# Patient Record
Sex: FEMALE | Race: WHITE | NOT HISPANIC OR LATINO | Employment: UNEMPLOYED | URBAN - METROPOLITAN AREA
[De-identification: names, ages, dates, MRNs, and addresses within clinical notes are randomized per-mention and may not be internally consistent; named-entity substitution may affect disease eponyms.]

---

## 2018-02-05 ENCOUNTER — TRANSCRIBE ORDERS (OUTPATIENT)
Dept: ENDOCRINOLOGY | Facility: CLINIC | Age: 23
End: 2018-02-05

## 2018-02-05 DIAGNOSIS — E10.9 DIABETES MELLITUS TYPE 1, UNCOMPLICATED (HCC): Primary | ICD-10-CM

## 2018-02-19 ENCOUNTER — OFFICE VISIT (OUTPATIENT)
Dept: ENDOCRINOLOGY | Facility: CLINIC | Age: 23
End: 2018-02-19

## 2018-02-19 VITALS
SYSTOLIC BLOOD PRESSURE: 118 MMHG | BODY MASS INDEX: 30.4 KG/M2 | OXYGEN SATURATION: 98 % | DIASTOLIC BLOOD PRESSURE: 76 MMHG | HEART RATE: 97 BPM | HEIGHT: 67 IN | WEIGHT: 193.7 LBS

## 2018-02-19 DIAGNOSIS — IMO0002 UNCONTROLLED TYPE 1 DIABETES MELLITUS WITH DIABETIC POLYNEUROPATHY: Primary | ICD-10-CM

## 2018-02-19 LAB
ARTICHOKE IGE QN: 166 MG/DL (ref 0–130)
CHOLEST SERPL-MCNC: 208 MG/DL (ref 0–200)
GLUCOSE BLDC GLUCOMTR-MCNC: 236 MG/DL (ref 70–130)
HBA1C MFR BLD: 7.6 %
HDLC SERPL-MCNC: 41 MG/DL (ref 40–60)
TRIGL SERPL-MCNC: 110 MG/DL (ref 0–150)

## 2018-02-19 PROCEDURE — 80061 LIPID PANEL: CPT | Performed by: PHYSICIAN ASSISTANT

## 2018-02-19 PROCEDURE — 86341 ISLET CELL ANTIBODY: CPT | Performed by: PHYSICIAN ASSISTANT

## 2018-02-19 PROCEDURE — 59 HC ISLET CELL AB SCREEN: Performed by: PHYSICIAN ASSISTANT

## 2018-02-19 PROCEDURE — 84681 ASSAY OF C-PEPTIDE: CPT | Performed by: PHYSICIAN ASSISTANT

## 2018-02-19 PROCEDURE — 59: Performed by: PHYSICIAN ASSISTANT

## 2018-02-19 PROCEDURE — 82947 ASSAY GLUCOSE BLOOD QUANT: CPT | Performed by: PHYSICIAN ASSISTANT

## 2018-02-19 PROCEDURE — 83036 HEMOGLOBIN GLYCOSYLATED A1C: CPT | Performed by: PHYSICIAN ASSISTANT

## 2018-02-19 PROCEDURE — 99204 OFFICE O/P NEW MOD 45 MIN: CPT | Performed by: PHYSICIAN ASSISTANT

## 2018-02-19 RX ORDER — PEN NEEDLE, DIABETIC 31 GX5/16"
NEEDLE, DISPOSABLE MISCELLANEOUS
Refills: 0 | COMMUNITY
Start: 2018-01-23 | End: 2018-03-05 | Stop reason: SDUPTHER

## 2018-02-19 RX ORDER — INSULIN GLARGINE 100 [IU]/ML
INJECTION, SOLUTION SUBCUTANEOUS
Refills: 0 | COMMUNITY
Start: 2018-01-22 | End: 2019-01-09 | Stop reason: SDUPTHER

## 2018-02-19 RX ORDER — LANCETS 28 GAUGE
EACH MISCELLANEOUS
Refills: 0 | COMMUNITY
Start: 2018-02-05 | End: 2020-07-07 | Stop reason: SDUPTHER

## 2018-02-19 RX ORDER — CALCIUM CITRATE/VITAMIN D3 200MG-6.25
TABLET ORAL
Refills: 0 | COMMUNITY
Start: 2018-02-10 | End: 2018-03-14 | Stop reason: SDUPTHER

## 2018-02-19 RX ORDER — INSULIN GLULISINE 100 [IU]/ML
INJECTION, SOLUTION SUBCUTANEOUS
Refills: 0 | COMMUNITY
Start: 2018-02-05 | End: 2019-01-09 | Stop reason: SDUPTHER

## 2018-02-19 RX ORDER — GABAPENTIN 100 MG/1
CAPSULE ORAL
Refills: 0 | COMMUNITY
Start: 2018-01-31 | End: 2020-10-06

## 2018-02-19 RX ORDER — GLUCAGON HYDROCHLORIDE 1 MG
KIT INJECTION
COMMUNITY
Start: 2018-02-15 | End: 2020-07-07 | Stop reason: SDUPTHER

## 2018-02-19 NOTE — PROGRESS NOTES
"Chief Complaint  Establish care for Diabetes Mellitus.    HPI   Suzanne Rowan is a 22 y.o. female who is here today for evaluation of Diabetes Mellitus type 1. Patient was referred by Leilani Davies,*. The initial diagnosis of diabetes was made 1/2018 when she was admitted with DKA. She was hospitalized for 2 weeks, on ventilator for part of that. Prior to that was having a lot of polydipsia. Now improved.  Sister has DM1. Has a 3 yo son.    Today- A1c 7.6,   Labs reviewed from 1/31/18- A1c 9.5, , AST 52, ALLT 67, microalbumin wnl, h/h wnl    Diabetic complications: peripheral neuropathy, numbness top of both feet, R>L, better since starting gabapentin  Eye exam current (within one year): no  Foot care and dental care: discussed    Current diabetic medications include:  Basaglar 10U QHS  Apidra 2U ac (meals), not taking anything before snacks  ACEI/ARB: no  Statin: no, <40 yoa  On Gabapentin 100mg QHS    Past medications: none    Diabetic Monitoring  - checks glucose 5-6x/day  Glucose is averaging- fasting and pre-meal readings 100-200, mostly   Hypoglycemia- couple of lows while out shopping, keeps hard candy with her  Home blood sugar records: glucometer downloaded, data reviewed and scanned to chart    Nutrition:     Current diet: on average, 2 meals per day plus a snack  Current exercise: none  Seen RD in past: no    The following portions of the patient's history were reviewed and updated by me as appropriate: allergies, current medications, past family history, past social history, past surgical history and problem list.    Past Medical History:   Diagnosis Date   • Acid reflux    • Diabetes mellitus type I    • Migraine        Medications    Current Outpatient Prescriptions:   •  APIDRA SOLOSTAR 100 UNIT/ML solution pen-injector, INJECT 2 UNITS SUBCUTANEOUSLY BEFORE MEALS FOR BLOOD SUGAR OVER 150 **NOT COVERED**, Disp: , Rfl: 0  •  B-D INS SYR ULTRAFINE 1CC/31G 31G X 5/16\" 1 ML misc, USE " "AS DIRECTED, Disp: , Rfl: 0  •  B-D ULTRAFINE III SHORT PEN 31G X 8 MM misc, USE ONCE DAILY WITH INSULIN, Disp: , Rfl: 0  •  Blood Glucose Monitoring Suppl (TRUE METRIX METER) w/Device kit, TEST THREE TIMES DAILY, Disp: , Rfl: 0  •  gabapentin (NEURONTIN) 100 MG capsule, TAKE 1 CAPSULE BY MOUTH EVERYDAY AT BEDTIME, Disp: , Rfl: 0  •  GLUCAGEN HYPOKIT 1 MG injection, , Disp: , Rfl:   •  Insulin Glargine (BASAGLAR KWIKPEN) 100 UNIT/ML injection pen, INJECT 10 UNITS UNDER THE SKIN ONCE DAILY, Disp: , Rfl: 0  •  TRUE METRIX BLOOD GLUCOSE TEST test strip, TEST THREE TIMES DAILY, Disp: , Rfl: 0  •  TRUEPLUS SAFETY LANCETS 28G misc, TEST THREE TIMES DAILY, Disp: , Rfl: 0    Review of Systems  Review of Systems   Constitutional: Negative for chills, fatigue, fever and unexpected weight change.   HENT: Positive for rhinorrhea and sore throat. Negative for ear pain, hearing loss and nosebleeds.    Eyes: Negative for pain, discharge, redness, itching and visual disturbance.   Respiratory: Positive for cough. Negative for shortness of breath and wheezing.    Cardiovascular: Negative for chest pain, palpitations and leg swelling.   Gastrointestinal: Positive for constipation. Negative for abdominal pain, blood in stool and diarrhea.   Endocrine: Negative for cold intolerance, heat intolerance and polydipsia.   Genitourinary: Negative for dysuria, frequency, hematuria, pelvic pain, vaginal bleeding and vaginal discharge.   Musculoskeletal: Negative for arthralgias, gait problem, joint swelling and myalgias.   Skin: Negative for rash.   Allergic/Immunologic: Negative.    Neurological: Negative for dizziness, syncope, weakness, numbness and headaches.   Hematological: Negative for adenopathy. Does not bruise/bleed easily.   Psychiatric/Behavioral: Negative for sleep disturbance and suicidal ideas. The patient is nervous/anxious.         Physical Exam    /76  Pulse 97  Ht 170 cm (66.93\")  Wt 87.9 kg (193 lb 11.2 oz)  SpO2 " 98%  BMI 30.4 kg/m2Body mass index is 30.4 kg/(m^2).  Physical Exam    Suzanne had a diabetic foot exam performed (no calluses or ulcers) today.   During the foot exam she had a monofilament test performed (intact sensation to monofilament bilaterally).    Vascular Status -  Her exam exhibits right foot vasculature normal. Her exam exhibits no right foot edema. Her exam exhibits left foot vasculature normal. Her exam exhibits no left foot edema.   Skin Integrity  -  Her right foot skin is intact.     Suzanne 's left foot skin is intact. .      Labs and Imaging   No results found for: HGBA1C  No visits with results within 1 Month(s) from this visit.  Latest known visit with results is:    Admission on 08/20/2014, Discharged on 08/24/2014   Component Date Value Ref Range Status   • WBC 08/21/2014 13.87* 4.50 - 13.50 K/mcL Final   • RBC 08/21/2014 4.64  3.89 - 5.14 M/mcL Final   • Hemoglobin 08/21/2014 12.8  11.5 - 15.5 g/dL Final   • Hematocrit 08/21/2014 38.1  34.5 - 44.0 % Final   • MCV 08/21/2014 82.1  80.0 - 99.0 fL Final   • MCH 08/21/2014 27.6  27.0 - 31.0 pg Final   • MCHC 08/21/2014 33.6  32.0 - 36.0 g/dL Final   • RDW-CV 08/21/2014 13.3  11.3 - 14.5 % Final   • Platelets 08/21/2014 268  150 - 450 K/mcL Final   • ABORh 08/21/2014 A Rh Positive   Final   • Antibody Screen 08/21/2014 Negative   Final   • Creatinine 08/21/2014 0.6  0.6 - 1.3 mg/dL Final   • Uric Acid 08/21/2014 5.2  3.1 - 7.8 mg/dL Final   • Alkaline Phosphatase 08/21/2014 280* 35 - 109 Units/L Final   • AST (SGOT) 08/21/2014 16  0 - 33 Units/L Final   • LDH 08/21/2014 184  120 - 246 Units/L Final   • ALT (SGPT) 08/21/2014 16  7 - 40 Units/L Final   • Total Bilirubin 08/21/2014 0.2* 0.3 - 1.2 mg/dL Final   • WBC 08/22/2014 11.94  4.50 - 13.50 K/mcL Final   • RBC 08/22/2014 4.12  3.89 - 5.14 M/mcL Final   • Hemoglobin 08/22/2014 11.2* 11.5 - 15.5 g/dL Final   • Hematocrit 08/22/2014 34.6  34.5 - 44.0 % Final   • MCV 08/22/2014 84.0  80.0 -  99.0 fL Final   • MCH 08/22/2014 27.2  27.0 - 31.0 pg Final   • MCHC 08/22/2014 32.4  32.0 - 36.0 g/dL Final   • RDW-CV 08/22/2014 13.7  11.3 - 14.5 % Final   • Platelets 08/22/2014 197  150 - 450 K/mcL Final     No images are attached to the encounter or orders placed in the encounter.  No Images in the past 120 days found..    Assessment / Plan   Andtracy was seen today for diabetes.    Diagnoses and all orders for this visit:    Uncontrolled type 1 diabetes mellitus with diabetic polyneuropathy  -     C-Peptide  -     Glutamic Acid Decarboxylase  -     Anti-islet Cell Antibody  -     IA-2 Autoantibodies  -     Lipid Panel  -     Ambulatory Referral to Diabetic Education        Diabetes Mellitus 1  -s/p recent dx with DKA, was hospitalized x 2 weeks  -A1c 7.6, down from 9.5 1/31/18  -she is checking sugar multiple times per day and is compliant with insulin  -increase basaglar to 11U  -continue taking 2U ac, can try taking 1U for snack  -discussed foot care, hypoglycemia tx  -discussed pump option with her. She is not interested at this time. Sister has DM1 and used a pump before but went back to injections.  -will refer to dexcom for CGM  -referral for diabetes education  -discussed nature of DM, Risks of uncontrolled DM.  -provided email for questions, BG issues    1.  Diet: 3-4 carb servings per meal for females, 4-5 carb servings per meal for males  Spread carb intake throughout the day  Increase lean protein and vegetable intake  Avoid sugary drinks and processed carbs including crackers, cookies, cakes  2.  Exercise: Recommend at least 30 minutes of exercise daily, at least 5 days per week. Increase exercise gradually.   3.  Blood Glucose Goal: Blood glucose goal <150 fasting, <180 2 hr postprandial  4.  Microalbumin due 1/2019  5.  Education performed during this visit: long term diabetic complications discussed. , annual eye examinations at Ophthalmology discussed, dental hygiene discussed  and foot care  reviewed., home glucose monitoring emphasized, all medications, side effects and compliance discussed carefully and Hypoglycemia management and prevention reviewed. Reviewed ‘ABCs’ of diabetes management (respective goals in parentheses):  A1C (<7), blood pressure (<130/80), and cholesterol (LDL <100, if CVD <70).    There are no Patient Instructions on file for this visit.    Follow up: Return in about 3 months (around 5/19/2018).    Discussed the nature of the disease including, risks, complications, implications, management, safe and proper use of medications. Encouraged therapeutic lifestyle changes including low calorie diet with plenty of fruits and vegetables, daily exercise, medication compliance, and keeping scheduled follow up appointments with me and any other providers. Encouraged patient to have appointment for complete physical, fasting labs, appropriate screenings, and immunizations on an annual basis.    30 min  of 45 min face-to-face visit time spent for coordination of care and counselling regarding identified problems as outlined in the objective, assessment and discussion portions of the documentation.    Anais Cee PA-C        .

## 2018-02-20 LAB — C PEPTIDE SERPL-MCNC: 11.8 NG/ML (ref 1.1–4.4)

## 2018-02-21 LAB
GAD65 AB SER-ACNC: <5 U/ML (ref 0–5)
PANC ISLET CELL AB TITR SER: NEGATIVE {TITER}

## 2018-02-23 LAB — ISLET CELL512 AB SER-ACNC: <1 U/ML

## 2018-03-05 DIAGNOSIS — E10.9 TYPE 1 DIABETES MELLITUS WITHOUT COMPLICATION (HCC): Primary | ICD-10-CM

## 2018-03-05 RX ORDER — PEN NEEDLE, DIABETIC 31 GX5/16"
NEEDLE, DISPOSABLE MISCELLANEOUS
Qty: 120 EACH | Refills: 12 | Status: SHIPPED | OUTPATIENT
Start: 2018-03-05 | End: 2019-08-07 | Stop reason: SDUPTHER

## 2018-03-12 ENCOUNTER — HOSPITAL ENCOUNTER (OUTPATIENT)
Dept: DIABETES SERVICES | Facility: HOSPITAL | Age: 23
Setting detail: RECURRING SERIES
Discharge: HOME | End: 2018-03-12

## 2018-03-12 PROCEDURE — G0108 DIAB MANAGE TRN  PER INDIV: HCPCS | Performed by: DIETITIAN, REGISTERED

## 2018-03-14 DIAGNOSIS — E10.9 TYPE 1 DIABETES MELLITUS WITHOUT COMPLICATION (HCC): Primary | ICD-10-CM

## 2018-03-14 RX ORDER — CALCIUM CITRATE/VITAMIN D3 200MG-6.25
TABLET ORAL
Qty: 150 EACH | Refills: 12 | Status: SHIPPED | OUTPATIENT
Start: 2018-03-14 | End: 2018-05-07 | Stop reason: SDUPTHER

## 2018-04-09 ENCOUNTER — TELEPHONE (OUTPATIENT)
Dept: INTERNAL MEDICINE | Facility: CLINIC | Age: 23
End: 2018-04-09

## 2018-04-09 NOTE — TELEPHONE ENCOUNTER
Returned Ben's call and informed him that we did receive the fax but have just been waiting on Dr. Beckford to return to the office so that she can sign the documents in order for us to fax it back.

## 2018-04-09 NOTE — TELEPHONE ENCOUNTER
CALL BACK [phone] A REP WITH [employer] CALLED REGARDING NEEDING CHART NOTES, CERT OF MEDICAL NECESSITY. THEY STATE THIS HAS BEEN SENT SEVERAL TIME WITH NO RESPONSE. VERIFIED FAX # FOR THIS OFFICE WITH REP AND THEY STATE THEY WILL RESEND.

## 2018-05-07 DIAGNOSIS — E10.9 TYPE 1 DIABETES MELLITUS WITHOUT COMPLICATION (HCC): ICD-10-CM

## 2018-05-07 RX ORDER — CALCIUM CITRATE/VITAMIN D3 200MG-6.25
TABLET ORAL
Qty: 200 EACH | Refills: 12 | Status: SHIPPED | OUTPATIENT
Start: 2018-05-07 | End: 2020-07-07 | Stop reason: SDUPTHER

## 2018-05-24 ENCOUNTER — OFFICE VISIT (OUTPATIENT)
Dept: ENDOCRINOLOGY | Facility: CLINIC | Age: 23
End: 2018-05-24

## 2018-05-24 VITALS
WEIGHT: 202 LBS | HEART RATE: 115 BPM | SYSTOLIC BLOOD PRESSURE: 116 MMHG | BODY MASS INDEX: 31.71 KG/M2 | OXYGEN SATURATION: 92 % | HEIGHT: 67 IN | DIASTOLIC BLOOD PRESSURE: 74 MMHG

## 2018-05-24 DIAGNOSIS — E10.42 TYPE 1 DIABETES MELLITUS WITH DIABETIC POLYNEUROPATHY (HCC): Primary | ICD-10-CM

## 2018-05-24 LAB
GLUCOSE BLDC GLUCOMTR-MCNC: 199 MG/DL (ref 70–130)
HBA1C MFR BLD: 6.8 %

## 2018-05-24 PROCEDURE — 82947 ASSAY GLUCOSE BLOOD QUANT: CPT | Performed by: PHYSICIAN ASSISTANT

## 2018-05-24 PROCEDURE — 99214 OFFICE O/P EST MOD 30 MIN: CPT | Performed by: PHYSICIAN ASSISTANT

## 2018-05-24 PROCEDURE — 83036 HEMOGLOBIN GLYCOSYLATED A1C: CPT | Performed by: PHYSICIAN ASSISTANT

## 2018-05-24 NOTE — PROGRESS NOTES
Chief Complaint  F/u for Diabetes Mellitus.    HPI   Suzanne Rowan is a 23 y.o. female who is here today for F/u of Diabetes Mellitus type 1. The initial diagnosis of diabetes was made 1/2018 when she was admitted with DKA. She was hospitalized for 2 weeks, on ventilator for part of that. Prior to that was having a lot of polydipsia.   Sister has DM1. Has a 3 yo son.    Hx of enlarged spleen, sees GI 6/2018.  Received her Dexcom, but hasn't started using it yet. She's hesitant to have something attached to her all the time  A1c- 6.8 (5/24/18), 7.6 (2/19/18), 9.5 (1/31/18)  Labs reviewed from 1/31/18- , AST 52, ALT 67, microalbumin wnl, h/h wnl    Diabetic complications: peripheral neuropathy, numbness top of both feet, R>L, better since starting gabapentin  Eye exam current (within one year): no  Foot care and dental care: discussed    Current diabetic medications include:  Basaglar 11U QHS  Apidra 2U ac (meals), 1U before snacks  ACEI/ARB: no  Statin: no, <40 yoa  On Gabapentin 100mg QHS- ran out a month ago. No difference in symptoms. Has some tingling at night but not bad, doesn't think she needs to take gabapentin.     Past medications: none    Diabetic Monitoring  - checks glucose 5-6x/day  Glucose is averaging- fasting 120-150, pre-meal sugar ~125   Hypoglycemia- no  Home blood sugar records: glucometer downloaded, data reviewed and scanned to chart    Nutrition:     Current diet: on average, 2 meals per day plus a snack  Current exercise: none  Seen RD in past: no    The following portions of the patient's history were reviewed and updated by me as appropriate: allergies, current medications, past family history, past social history, past surgical history and problem list.    Past Medical History:   Diagnosis Date   • Acid reflux    • Diabetes mellitus type I    • Migraine        Medications    Current Outpatient Prescriptions:   •  APIDRA SOLOSTAR 100 UNIT/ML solution pen-injector, INJECT 2 UNITS  "SUBCUTANEOUSLY BEFORE MEALS FOR BLOOD SUGAR OVER 150 **NOT COVERED**, Disp: , Rfl: 0  •  B-D INS SYR ULTRAFINE 1CC/31G 31G X 5/16\" 1 ML misc, USE AS DIRECTED, Disp: , Rfl: 0  •  B-D ULTRAFINE III SHORT PEN 31G X 8 MM misc, Use QID, Disp: 120 each, Rfl: 12  •  Blood Glucose Monitoring Suppl (TRUE METRIX METER) w/Device kit, TEST THREE TIMES DAILY, Disp: , Rfl: 0  •  GLUCAGEN HYPOKIT 1 MG injection, , Disp: , Rfl:   •  Insulin Glargine (BASAGLAR KWIKPEN) 100 UNIT/ML injection pen, INJECT 10 UNITS UNDER THE SKIN ONCE DAILY, Disp: , Rfl: 0  •  TRUE METRIX BLOOD GLUCOSE TEST test strip, Test 5 times daily, Disp: 200 each, Rfl: 12  •  TRUEPLUS SAFETY LANCETS 28G misc, TEST THREE TIMES DAILY, Disp: , Rfl: 0  •  gabapentin (NEURONTIN) 100 MG capsule, TAKE 1 CAPSULE BY MOUTH EVERYDAY AT BEDTIME, Disp: , Rfl: 0    Review of Systems  Review of Systems   Constitutional: Negative for chills, fatigue, fever and unexpected weight change.   HENT: Negative for ear pain, hearing loss and nosebleeds.    Eyes: Negative for pain, discharge, redness, itching and visual disturbance.   Respiratory: Negative for shortness of breath and wheezing.    Cardiovascular: Negative for chest pain, palpitations and leg swelling.   Gastrointestinal: Negative for abdominal pain, blood in stool and diarrhea.   Endocrine: Negative for cold intolerance, heat intolerance and polydipsia.   Genitourinary: Negative for dysuria, frequency, hematuria, pelvic pain, vaginal bleeding and vaginal discharge.   Musculoskeletal: Negative for arthralgias, gait problem, joint swelling and myalgias.   Skin: Negative for rash.   Allergic/Immunologic: Negative.    Neurological: Negative for dizziness, syncope, weakness, numbness and headaches.        Mild tingling both feet- at night   Hematological: Negative for adenopathy. Does not bruise/bleed easily.   Psychiatric/Behavioral: Negative for sleep disturbance and suicidal ideas. The patient is nervous/anxious.       " "  Physical Exam    /74   Pulse 115   Ht 170 cm (66.93\")   Wt 91.6 kg (202 lb)   SpO2 92%   BMI 31.70 kg/m² Body mass index is 31.7 kg/m².  Physical Exam   Constitutional: She is oriented to person, place, and time. She appears well-developed. No distress.   HENT:   Head: Normocephalic.   Right Ear: External ear normal.   Left Ear: External ear normal.   Mouth/Throat: No oropharyngeal exudate.   Eyes: Conjunctivae and lids are normal. Right eye exhibits no discharge. Left eye exhibits no discharge. Right pupil is reactive. Left pupil is reactive.   Neck: No JVD present. No tracheal deviation present. No thyroid mass and no thyromegaly present.   Cardiovascular: Normal rate, regular rhythm, normal heart sounds and intact distal pulses.    No murmur heard.  Pulmonary/Chest: Effort normal and breath sounds normal. No respiratory distress. She has no wheezes.   Abdominal: Soft. Bowel sounds are normal. There is no tenderness.   Musculoskeletal: She exhibits no edema or tenderness.   Lymphadenopathy:     She has no cervical adenopathy.   Neurological: She is alert and oriented to person, place, and time.   Skin: Skin is warm, dry and intact. No rash noted. She is not diaphoretic. No erythema.   Psychiatric: She has a normal mood and affect. Her speech is normal and behavior is normal. Thought content normal.       Labs and Imaging   Lab Results   Component Value Date    HGBA1C 6.8 05/24/2018    HGBA1C 7.6 02/19/2018     Office Visit on 05/24/2018   Component Date Value Ref Range Status   • Hemoglobin A1C 05/24/2018 6.8  % Final   • Glucose 05/24/2018 199* 70 - 130 mg/dL Final     No images are attached to the encounter or orders placed in the encounter.  No Images in the past 120 days found..    Assessment / Plan   Andwjakob was seen today for diabetes.    Diagnoses and all orders for this visit:    Uncontrolled type 1 diabetes mellitus with diabetic polyneuropathy  -     POC Glycosylated Hemoglobin (Hb A1C)  - "     POCT Glucose        Diabetes Mellitus 1  -s/p DKA 1/2018, this was the initial dx DM1, was hospitalized x 2 weeks  -A1c 6.8, down from 7.6 2/2018. No hypoglycemia.   -she is checking sugar multiple times per day and is compliant with insulin  -continue basaglar to 11U  -continue taking 2U ac, 1U for snack  -encouraged she start using the dexcom  -she is not interested in a pump. Sister used to have insulin pump and didn't like it  -went to diabetes education. Doesn't want to count carbs.  -discussed nature of DM, Risks of uncontrolled DM.  -she has my email for questions  -encouraged she have an eye exam  -she is not using any form of birth control. Discussed risk of congenital malformation with a1c >6.5    1.  Diet: 3-4 carb servings per meal for females, 4-5 carb servings per meal for males  Spread carb intake throughout the day  Increase lean protein and vegetable intake  Avoid sugary drinks and processed carbs including crackers, cookies, cakes  2.  Exercise: Recommend at least 30 minutes of exercise daily, at least 5 days per week. Increase exercise gradually.   3.  Blood Glucose Goal: Blood glucose goal <150 fasting, <180 2 hr postprandial  4.  Microalbumin due 1/2019  5.  Education performed during this visit: long term diabetic complications discussed. , annual eye examinations at Ophthalmology discussed, dental hygiene discussed  and foot care reviewed., home glucose monitoring emphasized, all medications, side effects and compliance discussed carefully and Hypoglycemia management and prevention reviewed. Reviewed ‘ABCs’ of diabetes management (respective goals in parentheses):  A1C (<7), blood pressure (<130/80), and cholesterol (LDL <100, if CVD <70).    There are no Patient Instructions on file for this visit.    Follow up: Return in about 3 months (around 8/24/2018).    Discussed the nature of the disease including, risks, complications, implications, management, safe and proper use of medications.  Encouraged therapeutic lifestyle changes including low calorie diet with plenty of fruits and vegetables, daily exercise, medication compliance, and keeping scheduled follow up appointments with me and any other providers. Encouraged patient to have appointment for complete physical, fasting labs, appropriate screenings, and immunizations on an annual basis.    20 min  of 30 min face-to-face visit time spent for coordination of care and counselling regarding identified problems as outlined in the objective, assessment and discussion portions of the documentation.    Anais Cee PA-C        .

## 2018-08-29 ENCOUNTER — OFFICE VISIT (OUTPATIENT)
Dept: ENDOCRINOLOGY | Facility: CLINIC | Age: 23
End: 2018-08-29

## 2018-08-29 VITALS
HEART RATE: 99 BPM | SYSTOLIC BLOOD PRESSURE: 118 MMHG | BODY MASS INDEX: 32.1 KG/M2 | WEIGHT: 204.5 LBS | OXYGEN SATURATION: 98 % | DIASTOLIC BLOOD PRESSURE: 84 MMHG

## 2018-08-29 DIAGNOSIS — E10.9 TYPE 1 DIABETES MELLITUS WITHOUT COMPLICATION (HCC): Primary | ICD-10-CM

## 2018-08-29 LAB
GLUCOSE BLDC GLUCOMTR-MCNC: 143 MG/DL (ref 70–130)
HBA1C MFR BLD: 6.3 %

## 2018-08-29 PROCEDURE — 82962 GLUCOSE BLOOD TEST: CPT | Performed by: PHYSICIAN ASSISTANT

## 2018-08-29 PROCEDURE — 83036 HEMOGLOBIN GLYCOSYLATED A1C: CPT | Performed by: PHYSICIAN ASSISTANT

## 2018-08-29 PROCEDURE — 99214 OFFICE O/P EST MOD 30 MIN: CPT | Performed by: PHYSICIAN ASSISTANT

## 2018-08-29 NOTE — PROGRESS NOTES
Chief Complaint  F/u for Diabetes Mellitus.    ABILIO Rowan is a 23 y.o. female who is here today for F/u of Diabetes Mellitus type 1. The initial diagnosis of diabetes was made 1/2018 when she was admitted with DKA. She was hospitalized for 2 weeks, on ventilator for part of that. Prior to that was having a lot of polydipsia.   Sister has DM1. Has a 3 yo son. Had GDM.       Still hesitant to use her Dexcom  A1c- 6.3 (8/29/18), 6.8 (5/24/18), 7.6 (2/19/18), 9.5 (1/31/18)  Labs reviewed from 1/31/18- , AST 52, ALT 67, microalbumin wnl, h/h wnl    Diabetic complications: peripheral neuropathy, numbness top of both feet, R>L, better since starting gabapentin  Eye exam current (within one year): no  Foot care and dental care: discussed    Current diabetic medications include:  Basaglar 11U QHS  Apidra 2U ac (meals), 1U before snacks  ACEI/ARB: no  Statin: no, <40 yoa  On Gabapentin 100mg QHS- ran out a month ago. No difference in symptoms. Has some tingling at night but not bad, doesn't think she needs to take gabapentin.     Past medications: none    Diabetic Monitoring  - checks glucose 2-3x/day  Glucose is averaging- fasting 100-150, pre-meal sugar ~120-160   Hypoglycemia- single episode hypoglycemia of 47 on a day she was busy and didn't eat all day.  Home blood sugar records: glucometer downloaded, data reviewed and scanned to chart    Nutrition:     Current diet: on average, 2 meals per day plus a snack  Current exercise: none  Seen RD in past: no    The following portions of the patient's history were reviewed and updated by me as appropriate: allergies, current medications, past family history, past social history, past surgical history and problem list.    Past Medical History:   Diagnosis Date   • Acid reflux    • Diabetes mellitus type I (CMS/[facility])    • Migraine        Medications    Current Outpatient Prescriptions:   •  APIDRA SOLOSTAR 100 UNIT/ML solution pen-injector, INJECT 2 UNITS  "SUBCUTANEOUSLY BEFORE MEALS FOR BLOOD SUGAR OVER 150 **NOT COVERED**, Disp: , Rfl: 0  •  B-D INS SYR ULTRAFINE 1CC/31G 31G X 5/16\" 1 ML misc, USE AS DIRECTED, Disp: , Rfl: 0  •  B-D ULTRAFINE III SHORT PEN 31G X 8 MM misc, Use QID, Disp: 120 each, Rfl: 12  •  Blood Glucose Monitoring Suppl (TRUE METRIX METER) w/Device kit, TEST THREE TIMES DAILY, Disp: , Rfl: 0  •  GLUCAGEN HYPOKIT 1 MG injection, , Disp: , Rfl:   •  Insulin Glargine (BASAGLAR KWIKPEN) 100 UNIT/ML injection pen, INJECT 10 UNITS UNDER THE SKIN ONCE DAILY, Disp: , Rfl: 0  •  TRUE METRIX BLOOD GLUCOSE TEST test strip, Test 5 times daily, Disp: 200 each, Rfl: 12  •  TRUEPLUS SAFETY LANCETS 28G misc, TEST THREE TIMES DAILY, Disp: , Rfl: 0  •  gabapentin (NEURONTIN) 100 MG capsule, TAKE 1 CAPSULE BY MOUTH EVERYDAY AT BEDTIME, Disp: , Rfl: 0    Review of Systems  Review of Systems   Constitutional: Negative for chills, fatigue, fever and unexpected weight change.   HENT: Negative for ear pain, hearing loss and nosebleeds.    Eyes: Negative for pain, discharge, redness, itching and visual disturbance.   Respiratory: Negative for shortness of breath and wheezing.    Cardiovascular: Negative for chest pain, palpitations and leg swelling.   Gastrointestinal: Negative for abdominal pain, blood in stool and diarrhea.   Endocrine: Negative for cold intolerance, heat intolerance and polydipsia.   Genitourinary: Negative for dysuria, frequency, hematuria, pelvic pain, vaginal bleeding and vaginal discharge.   Musculoskeletal: Negative for arthralgias, gait problem, joint swelling and myalgias.   Skin: Negative for rash.   Allergic/Immunologic: Negative.    Neurological: Negative for dizziness, syncope, weakness, numbness and headaches.        Mild tingling both feet- at night   Hematological: Negative for adenopathy. Does not bruise/bleed easily.   Psychiatric/Behavioral: Negative for sleep disturbance and suicidal ideas. The patient is nervous/anxious.       "   Physical Exam    /84   Pulse 99   Wt 92.8 kg (204 lb 8 oz)   SpO2 98%   BMI 32.10 kg/m² Body mass index is 32.1 kg/m².  Physical Exam   Constitutional: She is oriented to person, place, and time. She appears well-developed. No distress.   HENT:   Head: Normocephalic.   Right Ear: External ear normal.   Left Ear: External ear normal.   Mouth/Throat: No oropharyngeal exudate.   Eyes: Conjunctivae and lids are normal. Right eye exhibits no discharge. Left eye exhibits no discharge. Right pupil is reactive. Left pupil is reactive.   Neck: No JVD present. No tracheal deviation present. No thyroid mass and no thyromegaly present.   Cardiovascular: Normal rate, regular rhythm, normal heart sounds and intact distal pulses.    No murmur heard.  Pulmonary/Chest: Effort normal and breath sounds normal. No respiratory distress. She has no wheezes.   Abdominal: Soft. Bowel sounds are normal. There is no tenderness.   Musculoskeletal: She exhibits no edema or tenderness.   Lymphadenopathy:     She has no cervical adenopathy.   Neurological: She is alert and oriented to person, place, and time.   Skin: Skin is warm, dry and intact. No rash noted. She is not diaphoretic. No erythema.   Psychiatric: She has a normal mood and affect. Her speech is normal and behavior is normal. Thought content normal.       Labs and Imaging   Lab Results   Component Value Date    HGBA1C 6.3 08/29/2018    HGBA1C 6.8 05/24/2018    HGBA1C 7.6 02/19/2018     Office Visit on 08/29/2018   Component Date Value Ref Range Status   • Glucose 08/29/2018 143* 70 - 130 mg/dL Final   • Hemoglobin A1C 08/29/2018 6.3  % Final     No images are attached to the encounter or orders placed in the encounter.  No Images in the past 120 days found..    Assessment / Plan   Andwjakob was seen today for follow-up.    Diagnoses and all orders for this visit:    Type 1 diabetes mellitus without complication (CMS/[facility])  -     POC Glucose Fingerstick  -     POC  Glycosylated Hemoglobin (Hb A1C)        Diabetes Mellitus 1  -s/p DKA 1/2018, this was the initial dx DM1, was hospitalized x 2 weeks  -A1c 6.3, down from 6.8 last visit   -she is checking sugar multiple times per day and is compliant with insulin  -continue basaglar to 11U  -continue taking 2-3U ac  -encouraged not skipping meals  -encouraged she start using the dexcom again  -reviewed insulin pump options. She will think about tandem and let me know  -went to diabetes education. Doesn't want to count carbs.  -she has my email for questions  -encouraged she have an eye exam again    1.  Diet: 3-4 carb servings per meal for females, 4-5 carb servings per meal for males  Spread carb intake throughout the day  Increase lean protein and vegetable intake  Avoid sugary drinks and processed carbs including crackers, cookies, cakes  2.  Exercise: Recommend at least 30 minutes of exercise daily, at least 5 days per week. Increase exercise gradually.   3.  Blood Glucose Goal: Blood glucose goal <150 fasting, <180 2 hr postprandial  4.  Microalbumin due 1/2019  5.  Education performed during this visit: long term diabetic complications discussed. , annual eye examinations at Ophthalmology discussed, dental hygiene discussed  and foot care reviewed., home glucose monitoring emphasized, all medications, side effects and compliance discussed carefully and Hypoglycemia management and prevention reviewed. Reviewed ‘ABCs’ of diabetes management (respective goals in parentheses):  A1C (<7), blood pressure (<130/80), and cholesterol (LDL <100, if CVD <70).    There are no Patient Instructions on file for this visit.    Follow up: Return in about 4 months (around 12/29/2018).    Discussed the nature of the disease including, risks, complications, implications, management, safe and proper use of medications. Encouraged therapeutic lifestyle changes including low calorie diet with plenty of fruits and vegetables, daily exercise,  medication compliance, and keeping scheduled follow up appointments with me and any other providers. Encouraged patient to have appointment for complete physical, fasting labs, appropriate screenings, and immunizations on an annual basis.    20 min  of 30 min face-to-face visit time spent for coordination of care and counselling regarding identified problems as outlined in the objective, assessment and discussion portions of the documentation.    Anais Cee PA-C        .

## 2019-01-09 ENCOUNTER — OFFICE VISIT (OUTPATIENT)
Dept: ENDOCRINOLOGY | Facility: CLINIC | Age: 24
End: 2019-01-09

## 2019-01-09 VITALS
SYSTOLIC BLOOD PRESSURE: 130 MMHG | DIASTOLIC BLOOD PRESSURE: 82 MMHG | OXYGEN SATURATION: 98 % | BODY MASS INDEX: 32.96 KG/M2 | HEART RATE: 89 BPM | WEIGHT: 210 LBS

## 2019-01-09 DIAGNOSIS — E16.2 HYPOGLYCEMIA: ICD-10-CM

## 2019-01-09 DIAGNOSIS — E10.9 TYPE 1 DIABETES MELLITUS WITHOUT COMPLICATION (HCC): Primary | ICD-10-CM

## 2019-01-09 LAB
ALBUMIN SERPL-MCNC: 4.33 G/DL (ref 3.2–4.8)
ALBUMIN/GLOB SERPL: 1.9 G/DL (ref 1.5–2.5)
ALP SERPL-CCNC: 68 U/L (ref 25–100)
ALT SERPL W P-5'-P-CCNC: 23 U/L (ref 7–40)
ANION GAP SERPL CALCULATED.3IONS-SCNC: 5 MMOL/L (ref 3–11)
ARTICHOKE IGE QN: 111 MG/DL (ref 0–130)
AST SERPL-CCNC: 11 U/L (ref 0–33)
BILIRUB SERPL-MCNC: 0.2 MG/DL (ref 0.3–1.2)
BUN BLD-MCNC: 13 MG/DL (ref 9–23)
BUN/CREAT SERPL: 19.7 (ref 7–25)
CALCIUM SPEC-SCNC: 9.1 MG/DL (ref 8.7–10.4)
CHLORIDE SERPL-SCNC: 105 MMOL/L (ref 99–109)
CHOLEST SERPL-MCNC: 161 MG/DL (ref 0–200)
CO2 SERPL-SCNC: 29 MMOL/L (ref 20–31)
CREAT BLD-MCNC: 0.66 MG/DL (ref 0.6–1.3)
GFR SERPL CREATININE-BSD FRML MDRD: 112 ML/MIN/1.73
GLOBULIN UR ELPH-MCNC: 2.3 GM/DL
GLUCOSE BLD-MCNC: 155 MG/DL (ref 70–100)
GLUCOSE BLDC GLUCOMTR-MCNC: 201 MG/DL (ref 70–130)
HBA1C MFR BLD: 6.3 %
HDLC SERPL-MCNC: 55 MG/DL (ref 40–60)
POTASSIUM BLD-SCNC: 4.5 MMOL/L (ref 3.5–5.5)
PROT SERPL-MCNC: 6.6 G/DL (ref 5.7–8.2)
SODIUM BLD-SCNC: 139 MMOL/L (ref 132–146)
TRIGL SERPL-MCNC: 87 MG/DL (ref 0–150)
TSH SERPL DL<=0.05 MIU/L-ACNC: 2.03 MIU/ML (ref 0.35–5.35)

## 2019-01-09 PROCEDURE — 82043 UR ALBUMIN QUANTITATIVE: CPT | Performed by: PHYSICIAN ASSISTANT

## 2019-01-09 PROCEDURE — 84443 ASSAY THYROID STIM HORMONE: CPT | Performed by: PHYSICIAN ASSISTANT

## 2019-01-09 PROCEDURE — 82570 ASSAY OF URINE CREATININE: CPT | Performed by: PHYSICIAN ASSISTANT

## 2019-01-09 PROCEDURE — 80053 COMPREHEN METABOLIC PANEL: CPT | Performed by: PHYSICIAN ASSISTANT

## 2019-01-09 PROCEDURE — 99214 OFFICE O/P EST MOD 30 MIN: CPT | Performed by: PHYSICIAN ASSISTANT

## 2019-01-09 PROCEDURE — 80061 LIPID PANEL: CPT | Performed by: PHYSICIAN ASSISTANT

## 2019-01-09 PROCEDURE — 83036 HEMOGLOBIN GLYCOSYLATED A1C: CPT | Performed by: PHYSICIAN ASSISTANT

## 2019-01-09 PROCEDURE — 82947 ASSAY GLUCOSE BLOOD QUANT: CPT | Performed by: PHYSICIAN ASSISTANT

## 2019-01-09 RX ORDER — INSULIN GLARGINE 100 [IU]/ML
10 INJECTION, SOLUTION SUBCUTANEOUS NIGHTLY
Qty: 2 PEN | Refills: 6 | Status: SHIPPED | OUTPATIENT
Start: 2019-01-09 | End: 2020-07-07 | Stop reason: SDUPTHER

## 2019-01-09 RX ORDER — INSULIN GLULISINE 100 [IU]/ML
INJECTION, SOLUTION SUBCUTANEOUS
Qty: 2 PEN | Refills: 6 | Status: SHIPPED | OUTPATIENT
Start: 2019-01-09 | End: 2019-01-14 | Stop reason: SDUPTHER

## 2019-01-09 NOTE — PROGRESS NOTES
"Chief Complaint  F/u for Diabetes Mellitus.    HPI   Suzanne Rowan is a 23 y.o. female who is here today for F/u of Diabetes Mellitus type 1. The initial diagnosis of diabetes was made 1/2018 when she was admitted with DKA. She was hospitalized for 2 weeks, on ventilator for part of that. Prior to that was having a lot of polydipsia.   Sister has DM1. Had GDM.     A1c- 6.3 (1/9/19), 6.3 (8/29/18), 6.8 (5/24/18), 7.6 (2/19/18), 9.5 (1/31/18)  Labs reviewed from 1/31/18- , AST 52, ALT 67, microalbumin wnl, h/h wnl    Diabetic complications: peripheral neuropathy, numbness top of both feet, R>L, better since starting gabapentin  Eye exam current (within one year): no  Foot care and dental care: discussed    Current diabetic medications include:  Basaglar 11U QHS  Apidra 2U ac (meals), 1U before snacks  ACEI/ARB: no  Statin: no, <40 yoa    Past medications: gabapentin 100mg qd (didn't notice a difference off the medication)    Diabetic Monitoring  -   Reviewed limited bs readings from meter download- bs is <150, single episode hypoglycemia of 50 from meter download, however she reports hypoglycemia qod, as low as 39 one time    Nutrition:     Current diet: on average, 2 meals per day plus a snack  Current exercise: none  Seen RD in past: no    The following portions of the patient's history were reviewed and updated by me as appropriate: allergies, current medications, past family history, past social history, past surgical history and problem list.    Past Medical History:   Diagnosis Date   • Acid reflux    • Diabetes mellitus type I (CMS/[facility])    • Migraine        Medications    Current Outpatient Medications:   •  APIDRA SOLOSTAR 100 UNIT/ML solution pen-injector, Take 1-2 units before meals, total daily dose up to 20u, Disp: 2 pen, Rfl: 6  •  B-D INS SYR ULTRAFINE 1CC/31G 31G X 5/16\" 1 ML misc, USE AS DIRECTED, Disp: , Rfl: 0  •  B-D ULTRAFINE III SHORT PEN 31G X 8 MM misc, Use QID, Disp: 120 each, Rfl: " 12  •  Blood Glucose Monitoring Suppl (TRUE METRIX METER) w/Device kit, TEST THREE TIMES DAILY, Disp: , Rfl: 0  •  gabapentin (NEURONTIN) 100 MG capsule, TAKE 1 CAPSULE BY MOUTH EVERYDAY AT BEDTIME, Disp: , Rfl: 0  •  GLUCAGEN HYPOKIT 1 MG injection, , Disp: , Rfl:   •  Insulin Glargine (BASAGLAR KWIKPEN) 100 UNIT/ML injection pen, Inject 10 Units under the skin into the appropriate area as directed Every Night., Disp: 2 pen, Rfl: 6  •  TRUE METRIX BLOOD GLUCOSE TEST test strip, Test 5 times daily, Disp: 200 each, Rfl: 12  •  TRUEPLUS SAFETY LANCETS 28G misc, TEST THREE TIMES DAILY, Disp: , Rfl: 0    Review of Systems  Review of Systems   Constitutional: Negative for chills, fatigue, fever and unexpected weight change.   HENT: Negative for ear pain, hearing loss and nosebleeds.    Eyes: Negative for pain, discharge, redness, itching and visual disturbance.   Respiratory: Negative for shortness of breath and wheezing.    Cardiovascular: Negative for chest pain, palpitations and leg swelling.   Gastrointestinal: Negative for abdominal pain, blood in stool and diarrhea.   Endocrine: Negative for cold intolerance, heat intolerance and polydipsia.   Genitourinary: Negative for dysuria, frequency, hematuria, pelvic pain, vaginal bleeding and vaginal discharge.   Musculoskeletal: Negative for arthralgias, gait problem, joint swelling and myalgias.   Skin: Negative for rash.   Allergic/Immunologic: Negative.    Neurological: Negative for dizziness, syncope, weakness, numbness and headaches.        Mild tingling both feet- at night   Hematological: Negative for adenopathy. Does not bruise/bleed easily.   Psychiatric/Behavioral: Negative for sleep disturbance and suicidal ideas. The patient is nervous/anxious.         Physical Exam    /82   Pulse 89   Wt 95.3 kg (210 lb)   SpO2 98%   BMI 32.96 kg/m² Body mass index is 32.96 kg/m².  Physical Exam   Constitutional: She is oriented to person, place, and time. She  appears well-developed. No distress.   HENT:   Head: Normocephalic.   Right Ear: External ear normal.   Left Ear: External ear normal.   Mouth/Throat: No oropharyngeal exudate.   Eyes: Conjunctivae and lids are normal. Right eye exhibits no discharge. Left eye exhibits no discharge. Right pupil is reactive. Left pupil is reactive.   Neck: No JVD present. No tracheal deviation present. No thyroid mass and no thyromegaly present.   Cardiovascular: Normal rate, regular rhythm, normal heart sounds and intact distal pulses.   No murmur heard.  Pulmonary/Chest: Effort normal and breath sounds normal. No respiratory distress. She has no wheezes.   Abdominal: Soft. Bowel sounds are normal. There is no tenderness.   Musculoskeletal: She exhibits no edema or tenderness.   Lymphadenopathy:     She has no cervical adenopathy.   Neurological: She is alert and oriented to person, place, and time.   Skin: Skin is warm, dry and intact. No rash noted. She is not diaphoretic. No erythema.   Psychiatric: She has a normal mood and affect. Her speech is normal and behavior is normal. Thought content normal.       Labs and Imaging   Lab Results   Component Value Date    HGBA1C 6.3 01/09/2019    HGBA1C 6.3 08/29/2018    HGBA1C 6.8 05/24/2018     Office Visit on 01/09/2019   Component Date Value Ref Range Status   • Glucose 01/09/2019 201* 70 - 130 mg/dL Final   • Hemoglobin A1C 01/09/2019 6.3  % Final     No images are attached to the encounter or orders placed in the encounter.  No Images in the past 120 days found..    Assessment / Plan   Andtracy was seen today for follow-up.    Diagnoses and all orders for this visit:    Type 1 diabetes mellitus without complication (CMS/[facility])  -     Microalbumin / Creatinine Urine Ratio - Urine, Clean Catch  -     POC Glucose Fingerstick  -     POC Glycosylated Hemoglobin (Hb A1C)  -     Lipid Panel  -     Comprehensive Metabolic Panel  -     TSH  -     Insulin Glargine (BASAGLAR KWIKPEN) 100 UNIT/ML  injection pen; Inject 10 Units under the skin into the appropriate area as directed Every Night.  -     APIDRA SOLOSTAR 100 UNIT/ML solution pen-injector; Take 1-2 units before meals, total daily dose up to 20u    Hypoglycemia        Diabetes Mellitus 1  -s/p DKA 1/2018, this was the initial dx of DM1, was hospitalized x 2 weeks  -diabetes is under fair control now- having frequent hypoglycemia  -A1c 6.3  -she is not checking her bs very often, on average once a day- discussed need to check ac and hs  -encouraged her to start using her dexcom. She needs help setting it up. Provided phone number for [employer] with dexcom to assist with set up. dexcom will be beneficial, especially with recent lows.   -decrease basaglar to 10U qhs  -decrease apidra to 1-2U ac  -again emphasis on either using dexcom or checking bs ac and hs  -reviewed hypoglycemia tx   -she is not interested in insulin pump  -went to diabetes education. Doesn't want to count carbs.  -she has my email for questions  -encouraged she have an eye exam again    1.  Diet: 3-4 carb servings per meal for females, 4-5 carb servings per meal for males  Spread carb intake throughout the day  Increase lean protein and vegetable intake  Avoid sugary drinks and processed carbs including crackers, cookies, cakes  2.  Exercise: Recommend at least 30 minutes of exercise daily, at least 5 days per week. Increase exercise gradually.   3.  Blood Glucose Goal: Blood glucose goal <150 fasting, <180 2 hr postprandial  4.  Microalbumin due 1/2019  5.  Education performed during this visit: long term diabetic complications discussed. , annual eye examinations at Ophthalmology discussed, dental hygiene discussed  and foot care reviewed., home glucose monitoring emphasized, all medications, side effects and compliance discussed carefully and Hypoglycemia management and prevention reviewed. Reviewed ‘ABCs’ of diabetes management (respective goals in parentheses):  A1C (<7), blood  pressure (<130/80), and cholesterol (LDL <100, if CVD <70).    There are no Patient Instructions on file for this visit.    Follow up: Return in about 3 months (around 4/9/2019).    Discussed the nature of the disease including, risks, complications, implications, management, safe and proper use of medications. Encouraged therapeutic lifestyle changes including low calorie diet with plenty of fruits and vegetables, daily exercise, medication compliance, and keeping scheduled follow up appointments with me and any other providers. Encouraged patient to have appointment for complete physical, fasting labs, appropriate screenings, and immunizations on an annual basis.    20 min  of 30 min face-to-face visit time spent for coordination of care and counselling regarding identified problems as outlined in the objective, assessment and discussion portions of the documentation.    Anais Cee PA-C        .

## 2019-01-11 LAB
CREAT 24H UR-MCNC: 97.6 MG/DL
MICROALBUMIN UR-MCNC: 5.1 UG/ML
MICROALBUMIN/CREAT UR: 5.2 MG/G CREAT (ref 0–30)

## 2019-01-14 DIAGNOSIS — E10.9 TYPE 1 DIABETES MELLITUS WITHOUT COMPLICATION (HCC): ICD-10-CM

## 2019-01-14 RX ORDER — INSULIN GLULISINE 100 [IU]/ML
INJECTION, SOLUTION SUBCUTANEOUS
Qty: 2 PEN | Refills: 6 | Status: SHIPPED | OUTPATIENT
Start: 2019-01-14 | End: 2019-01-29 | Stop reason: CLARIF

## 2019-08-07 DIAGNOSIS — E10.9 TYPE 1 DIABETES MELLITUS WITHOUT COMPLICATION (HCC): ICD-10-CM

## 2019-08-07 RX ORDER — PEN NEEDLE, DIABETIC 31 GX5/16"
NEEDLE, DISPOSABLE MISCELLANEOUS
Qty: 120 EACH | Refills: 12 | Status: SHIPPED | OUTPATIENT
Start: 2019-08-07 | End: 2020-07-07 | Stop reason: SDUPTHER

## 2020-07-07 ENCOUNTER — TELEMEDICINE (OUTPATIENT)
Dept: ENDOCRINOLOGY | Facility: CLINIC | Age: 25
End: 2020-07-07

## 2020-07-07 DIAGNOSIS — E10.9 CONTROLLED TYPE 1 DIABETES MELLITUS WITHOUT COMPLICATION (HCC): Primary | ICD-10-CM

## 2020-07-07 PROCEDURE — 99214 OFFICE O/P EST MOD 30 MIN: CPT | Performed by: PHYSICIAN ASSISTANT

## 2020-07-07 RX ORDER — LANCETS 28 GAUGE
EACH MISCELLANEOUS
Qty: 150 EACH | Refills: 12 | Status: SHIPPED | OUTPATIENT
Start: 2020-07-07

## 2020-07-07 RX ORDER — CALCIUM CITRATE/VITAMIN D3 200MG-6.25
TABLET ORAL
Qty: 150 EACH | Refills: 12 | Status: SHIPPED | OUTPATIENT
Start: 2020-07-07 | End: 2020-11-23 | Stop reason: SDUPTHER

## 2020-07-07 RX ORDER — INSULIN GLARGINE 100 [IU]/ML
10 INJECTION, SOLUTION SUBCUTANEOUS NIGHTLY
Qty: 2 PEN | Refills: 6 | Status: SHIPPED | OUTPATIENT
Start: 2020-07-07 | End: 2020-11-23 | Stop reason: SDUPTHER

## 2020-07-07 RX ORDER — GLUCAGON HYDROCHLORIDE 1 MG
1 KIT INJECTION AS NEEDED
Qty: 1 MG | Refills: 6 | Status: SHIPPED | OUTPATIENT
Start: 2020-07-07 | End: 2020-11-23 | Stop reason: SDUPTHER

## 2020-07-07 RX ORDER — PEN NEEDLE, DIABETIC 31 GX5/16"
NEEDLE, DISPOSABLE MISCELLANEOUS
Qty: 150 EACH | Refills: 12 | Status: SHIPPED | OUTPATIENT
Start: 2020-07-07 | End: 2020-11-23 | Stop reason: SDUPTHER

## 2020-07-07 RX ORDER — INSULIN LISPRO 100 [IU]/ML
INJECTION, SOLUTION INTRAVENOUS; SUBCUTANEOUS
Qty: 2 PEN | Refills: 6 | Status: SHIPPED | OUTPATIENT
Start: 2020-07-07 | End: 2020-11-23 | Stop reason: SDUPTHER

## 2020-07-07 NOTE — PROGRESS NOTES
Video visit conducted to comply with patient safety concerns in accordance with CDC recommendations for the COVID-19 pandemic.     Chief Complaint  F/u for Diabetes Mellitus.    HPI   Suzanne Rowan is a 25 y.o. female who is here today for F/u of Diabetes Mellitus type 1. The initial diagnosis of diabetes was made 1/2018 when she was admitted with DKA. She was hospitalized for 2 weeks, on ventilator for part of that. Prior to that was having a lot of polydipsia.   Sister has DM1. Had GDM.     Pt not seen since 1/2019.  Doing well.  Moved to [place], [place] recently.    A1c- 6.3 (1/9/19), 6.3 (8/29/18), 6.8 (5/24/18), 7.6 (2/19/18), 9.5 (1/31/18)    Diabetic complications: peripheral neuropathy, numbness top of both feet, R>L, better since starting gabapentin  Eye exam current (within one year): no  Foot care and dental care: discussed    Current diabetic medications include:  Basaglar 11-12U QHS  Admelog 2U ac (meals), 1U before snacks  ACEI/ARB: no  Statin: no, <40 yoa    Past medications: gabapentin 100mg qd (didn't notice a difference off the medication)    Diabetic Monitoring  -   Pt reports most bs readings , rare bs in the 200s, hypoglycemia about 1-2x/month. She had hypoglycemia in the 30s on a day she didn't eat anything and did not take any Admelog    Nutrition:     Current diet: on average, 2 meals per day plus a snack  Current exercise: none  Seen RD in past: no    The following portions of the patient's history were reviewed and updated by me as appropriate: allergies, current medications, past family history, past social history, past surgical history and problem list.      Past Medical History:   Diagnosis Date   • Acid reflux    • Diabetes mellitus type I (CMS/[facility])    • Migraine        Medications    Current Outpatient Medications:   •  B-D ULTRAFINE III SHORT PEN 31G X 8 MM misc, Use QID, Disp: 150 each, Rfl: 12  •  Blood Glucose Monitoring Suppl (TRUE METRIX METER) w/Device kit, TEST THREE  TIMES DAILY, Disp: , Rfl: 0  •  gabapentin (NEURONTIN) 100 MG capsule, TAKE 1 CAPSULE BY MOUTH EVERYDAY AT BEDTIME, Disp: , Rfl: 0  •  GLUCAGEN HYPOKIT 1 MG injection, Inject 1 mg under the skin into the appropriate area as directed As Needed for Low Blood Sugar., Disp: 1 mg, Rfl: 6  •  Insulin Glargine (BASAGLAR KWIKPEN) 100 UNIT/ML injection pen, Inject 10 Units under the skin into the appropriate area as directed Every Night., Disp: 2 pen, Rfl: 6  •  Insulin Lispro, 1 Unit Dial, (HUMALOG) 100 UNIT/ML solution pen-injector, Inject 2 units before meals, 1 unit before snacks, CF 1:100 >150, up to 20u daily, Disp: 2 pen, Rfl: 6  •  TRUE METRIX BLOOD GLUCOSE TEST test strip, Test 4 times daily, Disp: 150 each, Rfl: 12  •  TRUEplus Safety Lancets 28G misc, Use 4x/day for testing, Disp: 150 each, Rfl: 12    Review of Systems  Review of Systems   Constitutional: Negative for chills, fatigue, fever and unexpected weight change.   HENT: Negative for ear pain, hearing loss and nosebleeds.    Eyes: Negative for pain, discharge, redness, itching and visual disturbance.   Respiratory: Negative for shortness of breath and wheezing.    Cardiovascular: Negative for chest pain, palpitations and leg swelling.   Gastrointestinal: Negative for abdominal pain, blood in stool and diarrhea.   Endocrine: Negative for cold intolerance, heat intolerance and polydipsia.   Genitourinary: Negative for dysuria, frequency, hematuria, pelvic pain, vaginal bleeding and vaginal discharge.   Musculoskeletal: Negative for arthralgias, gait problem, joint swelling and myalgias.   Skin: Negative for rash.   Allergic/Immunologic: Negative.    Neurological: Negative for dizziness, syncope, weakness, numbness and headaches.        Mild tingling both feet- at night   Hematological: Negative for adenopathy. Does not bruise/bleed easily.   Psychiatric/Behavioral: Negative for sleep disturbance and suicidal ideas. The patient is not nervous/anxious.          Physical Exam    There were no vitals taken for this visit.There is no height or weight on file to calculate BMI.  Physical Exam   Constitutional: She appears well-developed and well-nourished. No distress.   AAOx3   HENT:   Head: Normocephalic and atraumatic.   Right Ear: External ear normal.   Left Ear: External ear normal.   Skin: She is not diaphoretic. No erythema. No pallor.   Psychiatric: She has a normal mood and affect. Her behavior is normal. Judgment and thought content normal.   Limited exam due video/phone visit      Labs and Imaging   Lab Results   Component Value Date    HGBA1C 6.3 01/09/2019    HGBA1C 6.3 08/29/2018    HGBA1C 6.8 05/24/2018     No visits with results within 1 Month(s) from this visit.   Latest known visit with results is:   Office Visit on 01/09/2019   Component Date Value Ref Range Status   • Creatinine, Urine 01/09/2019 97.6  Not Estab. mg/dL Final   • Microalbumin, Urine 01/09/2019 5.1  Not Estab. ug/mL Final   • Microalbumin/Creatinine Ratio 01/09/2019 5.2  0.0 - 30.0 mg/g creat Final                         Normal:                0.0 -  30.0                       Albuminuria:          31.0 - 300.0                       Clinical albuminuria:       >300.0   • Glucose 01/09/2019 201* 70 - 130 mg/dL Final   • Hemoglobin A1C 01/09/2019 6.3  % Final   • Total Cholesterol 01/09/2019 161  0 - 200 mg/dL Final   • Triglycerides 01/09/2019 87  0 - 150 mg/dL Final   • HDL Cholesterol 01/09/2019 55  40 - 60 mg/dL Final   • LDL Cholesterol  01/09/2019 111  0 - 130 mg/dL Final   • Glucose 01/09/2019 155* 70 - 100 mg/dL Final   • BUN 01/09/2019 13  9 - 23 mg/dL Final   • Creatinine 01/09/2019 0.66  0.60 - 1.30 mg/dL Final   • Sodium 01/09/2019 139  132 - 146 mmol/L Final   • Potassium 01/09/2019 4.5  3.5 - 5.5 mmol/L Final   • Chloride 01/09/2019 105  99 - 109 mmol/L Final   • CO2 01/09/2019 29.0  20.0 - 31.0 mmol/L Final   • Calcium 01/09/2019 9.1  8.7 - 10.4 mg/dL Final   • Total Protein  01/09/2019 6.6  5.7 - 8.2 g/dL Final   • Albumin 01/09/2019 4.33  3.20 - 4.80 g/dL Final   • ALT (SGPT) 01/09/2019 23  7 - 40 U/L Final   • AST (SGOT) 01/09/2019 11  0 - 33 U/L Final   • Alkaline Phosphatase 01/09/2019 68  25 - 100 U/L Final   • Total Bilirubin 01/09/2019 0.2* 0.3 - 1.2 mg/dL Final   • eGFR Non African Amer 01/09/2019 112  >60 mL/min/1.73 Final   • Globulin 01/09/2019 2.3  gm/dL Final   • A/G Ratio 01/09/2019 1.9  1.5 - 2.5 g/dL Final   • BUN/Creatinine Ratio 01/09/2019 19.7  7.0 - 25.0 Final   • Anion Gap 01/09/2019 5.0  3.0 - 11.0 mmol/L Final   • TSH 01/09/2019 2.027  0.350 - 5.350 mIU/mL Final     No images are attached to the encounter or orders placed in the encounter.  No Images in the past 120 days found..    Assessment / Plan   Andwjakob was seen today for follow-up.    Diagnoses and all orders for this visit:    Controlled type 1 diabetes mellitus without complication (CMS/HCC)  -     Microalbumin / Creatinine Urine Ratio - Urine, Clean Catch; Future  -     Comprehensive Metabolic Panel; Future  -     Lipid Panel; Future  -     TSH; Future  -     Hemoglobin A1c; Future  -     GLUCAGEN HYPOKIT 1 MG injection; Inject 1 mg under the skin into the appropriate area as directed As Needed for Low Blood Sugar.  -     Insulin Glargine (BASAGLAR KWIKPEN) 100 UNIT/ML injection pen; Inject 10 Units under the skin into the appropriate area as directed Every Night.  -     Insulin Lispro, 1 Unit Dial, (HUMALOG) 100 UNIT/ML solution pen-injector; Inject 2 units before meals, 1 unit before snacks, CF 1:100 >150, up to 20u daily  -     TRUE METRIX BLOOD GLUCOSE TEST test strip; Test 4 times daily  -     TRUEplus Safety Lancets 28G misc; Use 4x/day for testing  -     B-D ULTRAFINE III SHORT PEN 31G X 8 MM misc; Use QID        Diabetes Mellitus 1  -s/p DKA 1/2018, this was the initial dx of DM1, was hospitalized x 2 weeks  -diabetes is under good control based on pt reported bs readings, rare  hypoglycemia  -decrease basaglar to 10U qhs. Has had hypoglycemia after not eating all day (did not take any admelog that day)  -continue admelog 2 units before meals, 1 unit before snacks, add CF 1:100 >150  -she is not interested in insulin pump or CGM  -went to diabetes education. Doesn't want to count carbs.  -she has my email for questions  -encouraged she have an eye exam again  -mailing order for labs    1.  Diet: 3-4 carb servings per meal for females, 4-5 carb servings per meal for males  Spread carb intake throughout the day  Increase lean protein and vegetable intake  Avoid sugary drinks and processed carbs including crackers, cookies, cakes  2.  Exercise: Recommend at least 30 minutes of exercise daily, at least 5 days per week. Increase exercise gradually.   3.  Blood Glucose Goal: Blood glucose goal <150 fasting, <180 2 hr postprandial  4.  Microalbumin due 1/2019  5.  Education performed during this visit: long term diabetic complications discussed. , annual eye examinations at Ophthalmology discussed, dental hygiene discussed  and foot care reviewed., home glucose monitoring emphasized, all medications, side effects and compliance discussed carefully and Hypoglycemia management and prevention reviewed. Reviewed ‘ABCs’ of diabetes management (respective goals in parentheses):  A1C (<7), blood pressure (<130/80), and cholesterol (LDL <100, if CVD <70).    There are no Patient Instructions on file for this visit.    Follow up: Return in about 3 months (around 10/7/2020).    Discussed the nature of the disease including, risks, complications, implications, management, safe and proper use of medications. Encouraged therapeutic lifestyle changes including low calorie diet with plenty of fruits and vegetables, daily exercise, medication compliance, and keeping scheduled follow up appointments with me and any other providers. Encouraged patient to have appointment for complete physical, fasting labs,  appropriate screenings, and immunizations on an annual basis.    Total time of discussion was 11 minutes.    Anais Cee PA-C        .

## 2020-07-13 ENCOUNTER — LAB (OUTPATIENT)
Dept: ENDOCRINOLOGY | Facility: CLINIC | Age: 25
End: 2020-07-13

## 2020-07-13 ENCOUNTER — LAB (OUTPATIENT)
Dept: LAB | Facility: HOSPITAL | Age: 25
End: 2020-07-13

## 2020-07-13 DIAGNOSIS — E10.9 CONTROLLED TYPE 1 DIABETES MELLITUS WITHOUT COMPLICATION (HCC): ICD-10-CM

## 2020-07-13 PROCEDURE — 82570 ASSAY OF URINE CREATININE: CPT | Performed by: PHYSICIAN ASSISTANT

## 2020-07-13 PROCEDURE — 83036 HEMOGLOBIN GLYCOSYLATED A1C: CPT | Performed by: PHYSICIAN ASSISTANT

## 2020-07-13 PROCEDURE — 80061 LIPID PANEL: CPT | Performed by: PHYSICIAN ASSISTANT

## 2020-07-13 PROCEDURE — 80053 COMPREHEN METABOLIC PANEL: CPT | Performed by: PHYSICIAN ASSISTANT

## 2020-07-13 PROCEDURE — 82043 UR ALBUMIN QUANTITATIVE: CPT | Performed by: PHYSICIAN ASSISTANT

## 2020-07-13 PROCEDURE — 84443 ASSAY THYROID STIM HORMONE: CPT | Performed by: PHYSICIAN ASSISTANT

## 2020-07-14 LAB
ALBUMIN SERPL-MCNC: 4.4 G/DL (ref 3.5–5.2)
ALBUMIN UR-MCNC: <1.2 MG/DL
ALBUMIN/GLOB SERPL: 1.3 G/DL
ALP SERPL-CCNC: 51 U/L (ref 39–117)
ALT SERPL W P-5'-P-CCNC: 23 U/L (ref 1–33)
ANION GAP SERPL CALCULATED.3IONS-SCNC: 10.6 MMOL/L (ref 5–15)
AST SERPL-CCNC: 14 U/L (ref 1–32)
BILIRUB SERPL-MCNC: 0.2 MG/DL (ref 0–1.2)
BUN SERPL-MCNC: 8 MG/DL (ref 6–20)
BUN/CREAT SERPL: 10.7 (ref 7–25)
CALCIUM SPEC-SCNC: 9.8 MG/DL (ref 8.6–10.5)
CHLORIDE SERPL-SCNC: 100 MMOL/L (ref 98–107)
CHOLEST SERPL-MCNC: 205 MG/DL (ref 0–200)
CO2 SERPL-SCNC: 28.4 MMOL/L (ref 22–29)
CREAT SERPL-MCNC: 0.75 MG/DL (ref 0.57–1)
CREAT UR-MCNC: 49.5 MG/DL
GFR SERPL CREATININE-BSD FRML MDRD: 95 ML/MIN/1.73
GLOBULIN UR ELPH-MCNC: 3.4 GM/DL
GLUCOSE SERPL-MCNC: 109 MG/DL (ref 65–99)
HBA1C MFR BLD: 6.52 % (ref 4.8–5.6)
HDLC SERPL-MCNC: 50 MG/DL (ref 40–60)
LDLC SERPL CALC-MCNC: 125 MG/DL (ref 0–100)
LDLC/HDLC SERPL: 2.5 {RATIO}
MICROALBUMIN/CREAT UR: NORMAL MG/G{CREAT}
POTASSIUM SERPL-SCNC: 3.9 MMOL/L (ref 3.5–5.2)
PROT SERPL-MCNC: 7.8 G/DL (ref 6–8.5)
SODIUM SERPL-SCNC: 139 MMOL/L (ref 136–145)
TRIGL SERPL-MCNC: 151 MG/DL (ref 0–150)
TSH SERPL DL<=0.05 MIU/L-ACNC: 1.87 UIU/ML (ref 0.27–4.2)
VLDLC SERPL-MCNC: 30.2 MG/DL (ref 5–40)

## 2020-10-06 ENCOUNTER — OFFICE VISIT (OUTPATIENT)
Dept: ENDOCRINOLOGY | Facility: CLINIC | Age: 25
End: 2020-10-06

## 2020-10-06 VITALS
HEART RATE: 81 BPM | BODY MASS INDEX: 34.31 KG/M2 | OXYGEN SATURATION: 100 % | SYSTOLIC BLOOD PRESSURE: 122 MMHG | WEIGHT: 218.6 LBS | DIASTOLIC BLOOD PRESSURE: 82 MMHG

## 2020-10-06 DIAGNOSIS — E10.9 TYPE 1 DIABETES MELLITUS WITHOUT COMPLICATION (HCC): Primary | ICD-10-CM

## 2020-10-06 LAB
GLUCOSE BLDC GLUCOMTR-MCNC: 93 MG/DL (ref 70–130)
HBA1C MFR BLD: 6.6 %

## 2020-10-06 PROCEDURE — 82947 ASSAY GLUCOSE BLOOD QUANT: CPT | Performed by: PHYSICIAN ASSISTANT

## 2020-10-06 PROCEDURE — 83036 HEMOGLOBIN GLYCOSYLATED A1C: CPT | Performed by: PHYSICIAN ASSISTANT

## 2020-10-06 PROCEDURE — 99214 OFFICE O/P EST MOD 30 MIN: CPT | Performed by: PHYSICIAN ASSISTANT

## 2020-10-06 RX ORDER — PROCHLORPERAZINE 25 MG/1
1 SUPPOSITORY RECTAL
Qty: 1 EACH | Refills: 3 | Status: SHIPPED | OUTPATIENT
Start: 2020-10-06 | End: 2020-11-23 | Stop reason: SDUPTHER

## 2020-10-06 RX ORDER — PROCHLORPERAZINE 25 MG/1
SUPPOSITORY RECTAL
Qty: 3 EACH | Refills: 11 | Status: SHIPPED | OUTPATIENT
Start: 2020-10-06 | End: 2020-11-23 | Stop reason: SDUPTHER

## 2020-10-06 RX ORDER — PROCHLORPERAZINE 25 MG/1
1 SUPPOSITORY RECTAL CONTINUOUS
Qty: 1 DEVICE | Refills: 0 | Status: SHIPPED | OUTPATIENT
Start: 2020-10-06 | End: 2020-11-23 | Stop reason: SDUPTHER

## 2020-10-06 NOTE — PROGRESS NOTES
Chief Complaint  F/u for Diabetes Mellitus.    HPI   Suzanne Rowan is a 25 y.o. female who is here today for F/u of Diabetes Mellitus type 1. The initial diagnosis of diabetes was made 1/2018 when she was admitted with DKA. She was hospitalized for 2 weeks, on ventilator for part of that. Prior to that was having a lot of polydipsia.   Sister has DM1. Had GDM.     Pt reports to be doing well.  Admits not checking bs.  Rare lows.  Now interested in Dexcom and Tandem pump.     A1c- 6.6 (10/6/2020), 6.3 (1/9/19), 6.3 (8/29/18), 6.8 (5/24/18), 7.6 (2/19/18), 9.5 (1/31/18)    Diabetic complications: peripheral neuropathy, numbness top of both feet, R>L, better since starting gabapentin  Eye exam current (within one year): no  Foot care and dental care: discussed    Current diabetic medications include:  Basaglar 10-12U QHS  Admelog 2U ac (meals), 1U before snacks, not using CF  ACEI/ARB: no  Statin: no, <40 yoa    Past medications: gabapentin 100mg qd (didn't notice a difference off the medication)    Diabetic Monitoring  -   Only 4 readings on her meter- 144, 99, 184, 163    Nutrition:     Current diet: on average, 2 meals per day plus a snack  Current exercise: none  Seen RD in past: no    The following portions of the patient's history were reviewed and updated by me as appropriate: allergies, current medications, past family history, past social history, past surgical history and problem list.      Past Medical History:   Diagnosis Date   • Acid reflux    • Diabetes mellitus type I (CMS/[facility])    • Migraine        Medications    Current Outpatient Medications:   •  B-D ULTRAFINE III SHORT PEN 31G X 8 MM misc, Use QID, Disp: 150 each, Rfl: 12  •  Blood Glucose Monitoring Suppl (TRUE METRIX METER) w/Device kit, TEST THREE TIMES DAILY, Disp: , Rfl: 0  •  GLUCAGEN HYPOKIT 1 MG injection, Inject 1 mg under the skin into the appropriate area as directed As Needed for Low Blood Sugar., Disp: 1 mg, Rfl: 6  •  Insulin Glargine  (BASAGLAR KWIKPEN) 100 UNIT/ML injection pen, Inject 10 Units under the skin into the appropriate area as directed Every Night., Disp: 2 pen, Rfl: 6  •  Insulin Lispro, 1 Unit Dial, (HUMALOG) 100 UNIT/ML solution pen-injector, Inject 2 units before meals, 1 unit before snacks, CF 1:100 >150, up to 20u daily, Disp: 2 pen, Rfl: 6  •  TRUE METRIX BLOOD GLUCOSE TEST test strip, Test 4 times daily, Disp: 150 each, Rfl: 12  •  TRUEplus Safety Lancets 28G misc, Use 4x/day for testing, Disp: 150 each, Rfl: 12  •  Continuous Blood Gluc  (Dexcom G6 ) device, 1 each Continuous., Disp: 1 Device, Rfl: 0  •  Continuous Blood Gluc Sensor (Dexcom G6 Sensor), Every 10 (Ten) Days., Disp: 3 each, Rfl: 11  •  Continuous Blood Gluc Transmit (Dexcom G6 Transmitter) misc, 1 each Every 3 (Three) Months., Disp: 1 each, Rfl: 3    Review of Systems  Review of Systems   Constitutional: Negative for chills, fatigue, fever and unexpected weight change.   HENT: Negative for ear pain, hearing loss and nosebleeds.    Eyes: Negative for pain, discharge, redness, itching and visual disturbance.   Respiratory: Negative for shortness of breath and wheezing.    Cardiovascular: Negative for chest pain, palpitations and leg swelling.   Gastrointestinal: Negative for abdominal pain, blood in stool and diarrhea.   Endocrine: Negative for cold intolerance, heat intolerance and polydipsia.   Genitourinary: Negative for dysuria, frequency, hematuria, pelvic pain, vaginal bleeding and vaginal discharge.   Musculoskeletal: Negative for arthralgias, gait problem, joint swelling and myalgias.   Skin: Negative for rash.   Allergic/Immunologic: Negative.    Neurological: Negative for dizziness, syncope, weakness, numbness and headaches. Tremors:    Hematological: Negative for adenopathy. Does not bruise/bleed easily.   Psychiatric/Behavioral: Negative for sleep disturbance and suicidal ideas. The patient is not nervous/anxious.         Physical Exam     /82   Pulse 81   Wt 99.2 kg (218 lb 9.6 oz)   SpO2 100%   BMI 34.31 kg/m² Body mass index is 34.31 kg/m².  Physical Exam   Constitutional: She appears well-developed. No distress.   AAOx3   HENT:   Head: Normocephalic and atraumatic.   Right Ear: External ear normal.   Left Ear: External ear normal.    Suzanne had a diabetic foot exam performed today.   During the foot exam she had a monofilament test performed.    Neurological Sensory Findings - Unaltered hot/cold right ankle/foot discrimination and unaltered hot/cold left ankle/foot discrimination. Unaltered sharp/dull right ankle/foot discrimination and unaltered sharp/dull left ankle/foot discrimination. No right ankle/foot altered proprioception and no left ankle/foot altered proprioception  Vascular Status -  Her right foot exhibits normal foot vasculature  and no edema. Her left foot exhibits normal foot vasculature  and no edema.  Skin Integrity  -  Her right foot skin is intact.  She has no right foot onychomycosis, no right foot ulcer and non-callous right foot.Her left foot skin is intact. She has no left foot onychomycosis, no left foot ulcer and non-callous left foot..  Skin: She is not diaphoretic. No erythema. No pallor.   Psychiatric: Her behavior is normal. Judgment and thought content normal.         Labs and Imaging   Lab Results   Component Value Date    HGBA1C 6.6 10/06/2020    HGBA1C 6.52 (H) 07/13/2020    HGBA1C 6.3 01/09/2019     Office Visit on 10/06/2020   Component Date Value Ref Range Status   • Hemoglobin A1C 10/06/2020 6.6  % Final   • Glucose 10/06/2020 93  70 - 130 mg/dL Final     No images are attached to the encounter or orders placed in the encounter.  No Images in the past 120 days found..    Assessment / Plan   Suzanne was seen today for follow-up.    Diagnoses and all orders for this visit:    Type 1 diabetes mellitus without complication (CMS/[facility])  -     POC Glycosylated Hemoglobin (Hb A1C)  -     POC Glucose,  Blood  -     Continuous Blood Gluc Transmit (Dexcom G6 Transmitter) misc; 1 each Every 3 (Three) Months.  -     Continuous Blood Gluc  (Dexcom G6 ) device; 1 each Continuous.  -     Continuous Blood Gluc Sensor (Dexcom G6 Sensor); Every 10 (Ten) Days.  -     Basic Metabolic Panel; Future  -     C-Peptide; Future  -     Ambulatory Referral for Diabetic Eye Exam-Optometry        Diabetes Mellitus 1  -s/p DKA 1/2018, this was the initial dx of DM1, was hospitalized x 2 weeks  -diabetes is under good control, A1C 6.6 and pt denies hypoglycemia  -discussed bs monitoring is inadequate. Needs to check AC and HS.   -continue basaglar 10U qhs.   -continue admelog 2 units before meals, 1 unit before snacks, CF 1:100 >150  -is ready for cgm and pump. Has gone to diabetes education in the past and feels comfortable with carb counting  -sending dexcom to pharmacy. If not covered will try through DME  -will refer to tandem pump once I get her fasting c-peptide back  -encouraged she have an eye exam again - referral made    1.  Diet: 3-4 carb servings per meal for females, 4-5 carb servings per meal for males  Spread carb intake throughout the day  Increase lean protein and vegetable intake  Avoid sugary drinks and processed carbs including crackers, cookies, cakes  2.  Exercise: Recommend at least 30 minutes of exercise daily, at least 5 days per week. Increase exercise gradually.   3.  Blood Glucose Goal: Blood glucose goal <150 fasting, <180 2 hr postprandial  4.  Microalbumin due 7/2021  5.  Education performed during this visit: long term diabetic complications discussed. , annual eye examinations at Ophthalmology discussed, dental hygiene discussed  and foot care reviewed., home glucose monitoring emphasized, all medications, side effects and compliance discussed carefully and Hypoglycemia management and prevention reviewed. Reviewed ‘ABCs’ of diabetes management (respective goals in parentheses):  A1C (<7),  blood pressure (<130/80), and cholesterol (LDL <100, if CVD <70).    There are no Patient Instructions on file for this visit.    Follow up: Return in about 3 months (around 1/6/2021).    Discussed the nature of the disease including, risks, complications, implications, management, safe and proper use of medications. Encouraged therapeutic lifestyle changes including low calorie diet with plenty of fruits and vegetables, daily exercise, medication compliance, and keeping scheduled follow up appointments with me and any other providers. Encouraged patient to have appointment for complete physical, fasting labs, appropriate screenings, and immunizations on an annual basis.    15 min  of 25 min face-to-face visit time spent for coordination of care and counselling regarding identified problems as outlined in the objective, assessment and discussion portions of the documentation.      Anais Cee PA-C        .

## 2020-11-23 DIAGNOSIS — E10.9 CONTROLLED TYPE 1 DIABETES MELLITUS WITHOUT COMPLICATION (HCC): ICD-10-CM

## 2020-11-23 DIAGNOSIS — E10.9 TYPE 1 DIABETES MELLITUS WITHOUT COMPLICATION (HCC): ICD-10-CM

## 2020-11-23 RX ORDER — PROCHLORPERAZINE 25 MG/1
1 SUPPOSITORY RECTAL CONTINUOUS
Qty: 1 DEVICE | Refills: 0 | Status: SHIPPED | OUTPATIENT
Start: 2020-11-23 | End: 2022-12-02 | Stop reason: SDUPTHER

## 2020-11-23 RX ORDER — PROCHLORPERAZINE 25 MG/1
1 SUPPOSITORY RECTAL
Qty: 1 EACH | Refills: 3 | Status: SHIPPED | OUTPATIENT
Start: 2020-11-23 | End: 2021-04-27 | Stop reason: SDUPTHER

## 2020-11-23 RX ORDER — INSULIN GLARGINE 100 [IU]/ML
10 INJECTION, SOLUTION SUBCUTANEOUS NIGHTLY
Qty: 2 PEN | Refills: 6 | Status: SHIPPED | OUTPATIENT
Start: 2020-11-23 | End: 2021-01-22 | Stop reason: SDUPTHER

## 2020-11-23 RX ORDER — CALCIUM CITRATE/VITAMIN D3 200MG-6.25
TABLET ORAL
Qty: 150 EACH | Refills: 12 | Status: SHIPPED | OUTPATIENT
Start: 2020-11-23 | End: 2021-01-22 | Stop reason: SDUPTHER

## 2020-11-23 RX ORDER — INSULIN LISPRO 100 [IU]/ML
INJECTION, SOLUTION INTRAVENOUS; SUBCUTANEOUS
Qty: 2 PEN | Refills: 6 | Status: SHIPPED | OUTPATIENT
Start: 2020-11-23 | End: 2021-04-26

## 2020-11-23 RX ORDER — GLUCAGON HYDROCHLORIDE 1 MG
1 KIT INJECTION AS NEEDED
Qty: 1 MG | Refills: 6 | Status: SHIPPED | OUTPATIENT
Start: 2020-11-23 | End: 2022-01-11 | Stop reason: SDUPTHER

## 2020-11-23 RX ORDER — PEN NEEDLE, DIABETIC 31 GX5/16"
NEEDLE, DISPOSABLE MISCELLANEOUS
Qty: 150 EACH | Refills: 12 | Status: SHIPPED | OUTPATIENT
Start: 2020-11-23 | End: 2021-09-20

## 2020-11-23 RX ORDER — PROCHLORPERAZINE 25 MG/1
SUPPOSITORY RECTAL
Qty: 3 EACH | Refills: 11 | Status: SHIPPED | OUTPATIENT
Start: 2020-11-23 | End: 2021-04-27 | Stop reason: SDUPTHER

## 2021-01-22 ENCOUNTER — LAB (OUTPATIENT)
Dept: LAB | Facility: HOSPITAL | Age: 26
End: 2021-01-22

## 2021-01-22 ENCOUNTER — OFFICE VISIT (OUTPATIENT)
Dept: ENDOCRINOLOGY | Facility: CLINIC | Age: 26
End: 2021-01-22

## 2021-01-22 VITALS
OXYGEN SATURATION: 98 % | SYSTOLIC BLOOD PRESSURE: 118 MMHG | DIASTOLIC BLOOD PRESSURE: 80 MMHG | BODY MASS INDEX: 35.36 KG/M2 | HEART RATE: 103 BPM | WEIGHT: 225.3 LBS

## 2021-01-22 DIAGNOSIS — E10.65 UNCONTROLLED TYPE 1 DIABETES MELLITUS WITH HYPERGLYCEMIA (HCC): Primary | ICD-10-CM

## 2021-01-22 DIAGNOSIS — E10.9 TYPE 1 DIABETES MELLITUS WITHOUT COMPLICATION (HCC): ICD-10-CM

## 2021-01-22 LAB
ANION GAP SERPL CALCULATED.3IONS-SCNC: 10.1 MMOL/L (ref 5–15)
BUN SERPL-MCNC: 9 MG/DL (ref 6–20)
BUN/CREAT SERPL: 14.8 (ref 7–25)
CALCIUM SPEC-SCNC: 9.7 MG/DL (ref 8.6–10.5)
CHLORIDE SERPL-SCNC: 100 MMOL/L (ref 98–107)
CO2 SERPL-SCNC: 25.9 MMOL/L (ref 22–29)
CREAT SERPL-MCNC: 0.61 MG/DL (ref 0.57–1)
GFR SERPL CREATININE-BSD FRML MDRD: 121 ML/MIN/1.73
GLUCOSE BLDC GLUCOMTR-MCNC: 165 MG/DL (ref 70–130)
GLUCOSE SERPL-MCNC: 162 MG/DL (ref 65–99)
HBA1C MFR BLD: 7.8 %
POTASSIUM SERPL-SCNC: 4.4 MMOL/L (ref 3.5–5.2)
SODIUM SERPL-SCNC: 136 MMOL/L (ref 136–145)

## 2021-01-22 PROCEDURE — 99214 OFFICE O/P EST MOD 30 MIN: CPT | Performed by: PHYSICIAN ASSISTANT

## 2021-01-22 PROCEDURE — 83036 HEMOGLOBIN GLYCOSYLATED A1C: CPT | Performed by: PHYSICIAN ASSISTANT

## 2021-01-22 PROCEDURE — 84681 ASSAY OF C-PEPTIDE: CPT

## 2021-01-22 PROCEDURE — 82947 ASSAY GLUCOSE BLOOD QUANT: CPT | Performed by: PHYSICIAN ASSISTANT

## 2021-01-22 PROCEDURE — 80048 BASIC METABOLIC PNL TOTAL CA: CPT

## 2021-01-22 RX ORDER — INSULIN GLARGINE 100 [IU]/ML
10 INJECTION, SOLUTION SUBCUTANEOUS NIGHTLY
Qty: 6 ML | Refills: 6 | Status: SHIPPED | OUTPATIENT
Start: 2021-01-22 | End: 2021-04-26 | Stop reason: SDUPTHER

## 2021-01-22 RX ORDER — CALCIUM CITRATE/VITAMIN D3 200MG-6.25
TABLET ORAL
Qty: 150 EACH | Refills: 12 | Status: SHIPPED | OUTPATIENT
Start: 2021-01-22 | End: 2022-12-02 | Stop reason: SDUPTHER

## 2021-01-22 NOTE — PROGRESS NOTES
Chief Complaint  F/u for Diabetes Mellitus.    ABILIO Rowan is a 25 y.o. female who is here today for F/u of Diabetes Mellitus type 1. The initial diagnosis of diabetes was made 1/2018 when she was admitted with DKA. She was hospitalized for 2 weeks, on ventilator for part of that. Prior to that was having a lot of polydipsia.   Sister has DM1. Had GDM.     Has not picked up Dexcom from pharmacy.  Has not had c-peptide done yet. We were waiting on this to send referral for tandem pump.  Had eye exam in [place], not sure exactly where  Has been taking basal insulin regularly but only taking prandial insulin about once a week.     A1c- 6.6 (10/6/2020), 6.3 (1/9/19), 6.3 (8/29/18), 6.8 (5/24/18), 7.6 (2/19/18), 9.5 (1/31/18)    Diabetic complications: peripheral neuropathy, numbness top of both feet, R>L, better since starting gabapentin  Eye exam current (within one year): no  Foot care and dental care: discussed    Current diabetic medications include:  Basaglar 10 U QHS  Admelog 2U ac (meals), 1U before snacks, not using CF  ACEI/ARB: no  Statin: no, <40 yoa    Past medications: gabapentin 100mg qd (didn't notice a difference off the medication)    Diabetic Monitoring  -   Checking BG infrequently, once a day but most days none at all, BG ranging from 142 to 275 on meter review    Nutrition:     Current diet: on average, 2 meals per day plus a snack  Current exercise: none  Seen RD in past: no    The following portions of the patient's history were reviewed and updated by me as appropriate: allergies, current medications, past family history, past social history, past surgical history and problem list.      Past Medical History:   Diagnosis Date   • Acid reflux    • Diabetes mellitus type I (CMS/[facility])    • Migraine        Medications    Current Outpatient Medications:   •  B-D ULTRAFINE III SHORT PEN 31G X 8 MM misc, Use QID, Disp: 150 each, Rfl: 12  •  Blood Glucose Monitoring Suppl (TRUE METRIX METER)  w/Device kit, TEST THREE TIMES DAILY, Disp: , Rfl: 0  •  GlucaGen HypoKit 1 MG injection, Inject 1 mg under the skin into the appropriate area as directed As Needed for Low Blood Sugar., Disp: 1 mg, Rfl: 6  •  Insulin Glargine (BASAGLAR KWIKPEN) 100 UNIT/ML injection pen, Inject 10 Units under the skin into the appropriate area as directed Every Night., Disp: 6 mL, Rfl: 6  •  Insulin Lispro, 1 Unit Dial, (HUMALOG) 100 UNIT/ML solution pen-injector, Inject 2 units before meals, 1 unit before snacks, CF 1:100 >150, up to 20u daily, Disp: 2 pen, Rfl: 6  •  True Metrix Blood Glucose Test test strip, Test 4 times daily, Disp: 150 each, Rfl: 12  •  TRUEplus Safety Lancets 28G misc, Use 4x/day for testing, Disp: 150 each, Rfl: 12  •  Continuous Blood Gluc  (Dexcom G6 ) device, 1 each Continuous., Disp: 1 Device, Rfl: 0  •  Continuous Blood Gluc Sensor (Dexcom G6 Sensor), Every 10 (Ten) Days., Disp: 3 each, Rfl: 11  •  Continuous Blood Gluc Transmit (Dexcom G6 Transmitter) misc, 1 each Every 3 (Three) Months., Disp: 1 each, Rfl: 3    Review of Systems  Review of Systems   Constitutional: Negative for chills, fatigue, fever and unexpected weight change.   HENT: Negative for ear pain, hearing loss and nosebleeds.    Eyes: Negative for pain, discharge, redness, itching and visual disturbance.   Respiratory: Negative for shortness of breath and wheezing.    Cardiovascular: Negative for chest pain, palpitations and leg swelling.   Gastrointestinal: Negative for abdominal pain, blood in stool and diarrhea.   Endocrine: Negative for cold intolerance, heat intolerance and polydipsia.   Genitourinary: Negative for dysuria, frequency, hematuria, pelvic pain, vaginal bleeding and vaginal discharge.   Musculoskeletal: Negative for arthralgias, gait problem, joint swelling and myalgias.   Skin: Negative for rash.   Allergic/Immunologic: Negative.    Neurological: Negative for dizziness, syncope, weakness, numbness and  headaches. Tremors:    Hematological: Negative for adenopathy. Does not bruise/bleed easily.   Psychiatric/Behavioral: Negative for sleep disturbance and suicidal ideas. The patient is not nervous/anxious.         Physical Exam    /80   Pulse 103   Wt 102 kg (225 lb 4.8 oz)   SpO2 98%   BMI 35.36 kg/m² Body mass index is 35.36 kg/m².  Physical Exam   Constitutional: She is oriented to person, place, and time. She appears well-developed. No distress.   AAOx3   HENT:   Head: Normocephalic and atraumatic.   Right Ear: External ear normal.   Left Ear: External ear normal.   Mouth/Throat: No oropharyngeal exudate.   Eyes: Conjunctivae and lids are normal. Right eye exhibits no discharge. Left eye exhibits no discharge. Right pupil is reactive. Left pupil is reactive.   Neck: No JVD present. No tracheal deviation present. No thyroid mass and no thyromegaly present.   Cardiovascular: Normal rate, regular rhythm and normal heart sounds.   No murmur heard.  Pulmonary/Chest: Effort normal and breath sounds normal. No respiratory distress. She has no wheezes.   Abdominal: Soft. Bowel sounds are normal. There is no abdominal tenderness.   Musculoskeletal: No tenderness.   Lymphadenopathy:     She has no cervical adenopathy.   Neurological: She is alert and oriented to person, place, and time.   Skin: Skin is warm and dry. No rash noted. She is not diaphoretic. No erythema. No pallor.   Psychiatric: Her speech is normal and behavior is normal. Judgment and thought content normal.         Labs and Imaging   Lab Results   Component Value Date    HGBA1C 7.8 01/22/2021    HGBA1C 6.6 10/06/2020    HGBA1C 6.52 (H) 07/13/2020     Office Visit on 01/22/2021   Component Date Value Ref Range Status   • Glucose 01/22/2021 165* 70 - 130 mg/dL Final   • Hemoglobin A1C 01/22/2021 7.8  % Final     No images are attached to the encounter or orders placed in the encounter.  No Images in the past 120 days found..    Assessment / Plan    Diagnoses and all orders for this visit:    1. Uncontrolled type 1 diabetes mellitus with hyperglycemia (CMS/[facility]) (Primary)  -     POC Glucose, Blood  -     POC Glycosylated Hemoglobin (Hb A1C)  -     Insulin Glargine (BASAGLAR KWIKPEN) 100 UNIT/ML injection pen; Inject 10 Units under the skin into the appropriate area as directed Every Night.  Dispense: 6 mL; Refill: 6  -     True Metrix Blood Glucose Test test strip; Test 4 times daily  Dispense: 150 each; Refill: 12        Diabetes Mellitus 1 control is worsening  -s/p DKA 1/2018, this was the initial dx of DM1, was hospitalized x 2 weeks  -A1C is 7.8, up from 6.8 7/2020 due to not taking prandial insulin reguarly  -discussed bs monitoring is inadequate. Needs to check AC and HS.   -we discussed importance of taking her prandial insulin  -continue basaglar 10 U qhs.   -continue admelog 2 units before meals, 1 unit before snacks, CF 1:100 >150  -she will  dexcom at the pharmacy  -referral for tandem pump  -encouraged she have an eye exam again     1.  Diet: 3-4 carb servings per meal for females, 4-5 carb servings per meal for males  Spread carb intake throughout the day  Increase lean protein and vegetable intake  Avoid sugary drinks and processed carbs including crackers, cookies, cakes  2.  Exercise: Recommend at least 30 minutes of exercise daily, at least 5 days per week. Increase exercise gradually.   3.  Blood Glucose Goal: Blood glucose goal <150 fasting, <180 2 hr postprandial  4.  Microalbumin due 7/2021  5.  Education performed during this visit: long term diabetic complications discussed. , annual eye examinations at Ophthalmology discussed, dental hygiene discussed  and foot care reviewed., home glucose monitoring emphasized, all medications, side effects and compliance discussed carefully and Hypoglycemia management and prevention reviewed. Reviewed ‘ABCs’ of diabetes management (respective goals in parentheses):  A1C (<7), blood pressure  (<130/80), and cholesterol (LDL <100, if CVD <70).    There are no Patient Instructions on file for this visit.    Follow up: Return in about 3 months (around 4/22/2021).    Discussed the nature of the disease including, risks, complications, implications, management, safe and proper use of medications. Encouraged therapeutic lifestyle changes including low calorie diet with plenty of fruits and vegetables, daily exercise, medication compliance, and keeping scheduled follow up appointments with me and any other providers. Encouraged patient to have appointment for complete physical, fasting labs, appropriate screenings, and immunizations on an annual basis.        Anais Cee PA-C        .

## 2021-01-25 LAB — C PEPTIDE SERPL-MCNC: 7.3 NG/ML (ref 1.1–4.4)

## 2021-04-26 ENCOUNTER — OFFICE VISIT (OUTPATIENT)
Dept: ENDOCRINOLOGY | Facility: CLINIC | Age: 26
End: 2021-04-26

## 2021-04-26 ENCOUNTER — LAB (OUTPATIENT)
Dept: LAB | Facility: HOSPITAL | Age: 26
End: 2021-04-26

## 2021-04-26 VITALS
WEIGHT: 228.2 LBS | BODY MASS INDEX: 36.67 KG/M2 | OXYGEN SATURATION: 97 % | HEART RATE: 61 BPM | HEIGHT: 66 IN | SYSTOLIC BLOOD PRESSURE: 118 MMHG | DIASTOLIC BLOOD PRESSURE: 82 MMHG | TEMPERATURE: 98.2 F

## 2021-04-26 DIAGNOSIS — E10.65 UNCONTROLLED TYPE 1 DIABETES MELLITUS WITH HYPERGLYCEMIA (HCC): Primary | ICD-10-CM

## 2021-04-26 LAB
ALBUMIN SERPL-MCNC: 4.2 G/DL (ref 3.5–5.2)
ALBUMIN/GLOB SERPL: 1.3 G/DL
ALP SERPL-CCNC: 74 U/L (ref 39–117)
ALT SERPL W P-5'-P-CCNC: 49 U/L (ref 1–33)
ANION GAP SERPL CALCULATED.3IONS-SCNC: 13.7 MMOL/L (ref 5–15)
AST SERPL-CCNC: 19 U/L (ref 1–32)
BASOPHILS # BLD AUTO: 0.05 10*3/MM3 (ref 0–0.2)
BASOPHILS NFR BLD AUTO: 0.4 % (ref 0–1.5)
BILIRUB SERPL-MCNC: 0.2 MG/DL (ref 0–1.2)
BUN SERPL-MCNC: 7 MG/DL (ref 6–20)
BUN/CREAT SERPL: 14.3 (ref 7–25)
CALCIUM SPEC-SCNC: 9.5 MG/DL (ref 8.6–10.5)
CHLORIDE SERPL-SCNC: 99 MMOL/L (ref 98–107)
CHOLEST SERPL-MCNC: 191 MG/DL (ref 0–200)
CO2 SERPL-SCNC: 24.3 MMOL/L (ref 22–29)
CREAT SERPL-MCNC: 0.49 MG/DL (ref 0.57–1)
DEPRECATED RDW RBC AUTO: 39.4 FL (ref 37–54)
EOSINOPHIL # BLD AUTO: 0.32 10*3/MM3 (ref 0–0.4)
EOSINOPHIL NFR BLD AUTO: 2.8 % (ref 0.3–6.2)
ERYTHROCYTE [DISTWIDTH] IN BLOOD BY AUTOMATED COUNT: 13.4 % (ref 12.3–15.4)
EXPIRATION DATE: ABNORMAL
EXPIRATION DATE: NORMAL
GFR SERPL CREATININE-BSD FRML MDRD: >150 ML/MIN/1.73
GLOBULIN UR ELPH-MCNC: 3.2 GM/DL
GLUCOSE BLDC GLUCOMTR-MCNC: 256 MG/DL (ref 70–130)
GLUCOSE SERPL-MCNC: 244 MG/DL (ref 65–99)
HBA1C MFR BLD: 10.2 %
HCT VFR BLD AUTO: 44.1 % (ref 34–46.6)
HDLC SERPL-MCNC: 42 MG/DL (ref 40–60)
HGB BLD-MCNC: 14.2 G/DL (ref 12–15.9)
IMM GRANULOCYTES # BLD AUTO: 0.04 10*3/MM3 (ref 0–0.05)
IMM GRANULOCYTES NFR BLD AUTO: 0.3 % (ref 0–0.5)
LDLC SERPL CALC-MCNC: 134 MG/DL (ref 0–100)
LDLC/HDLC SERPL: 3.17 {RATIO}
LYMPHOCYTES # BLD AUTO: 2.93 10*3/MM3 (ref 0.7–3.1)
LYMPHOCYTES NFR BLD AUTO: 25.6 % (ref 19.6–45.3)
Lab: ABNORMAL
Lab: NORMAL
MCH RBC QN AUTO: 26.3 PG (ref 26.6–33)
MCHC RBC AUTO-ENTMCNC: 32.2 G/DL (ref 31.5–35.7)
MCV RBC AUTO: 81.7 FL (ref 79–97)
MONOCYTES # BLD AUTO: 0.68 10*3/MM3 (ref 0.1–0.9)
MONOCYTES NFR BLD AUTO: 5.9 % (ref 5–12)
NEUTROPHILS NFR BLD AUTO: 65 % (ref 42.7–76)
NEUTROPHILS NFR BLD AUTO: 7.43 10*3/MM3 (ref 1.7–7)
NRBC BLD AUTO-RTO: 0 /100 WBC (ref 0–0.2)
PLATELET # BLD AUTO: 336 10*3/MM3 (ref 140–450)
PMV BLD AUTO: 9.9 FL (ref 6–12)
POTASSIUM SERPL-SCNC: 3.8 MMOL/L (ref 3.5–5.2)
PROT SERPL-MCNC: 7.4 G/DL (ref 6–8.5)
RBC # BLD AUTO: 5.4 10*6/MM3 (ref 3.77–5.28)
SODIUM SERPL-SCNC: 137 MMOL/L (ref 136–145)
TRIGL SERPL-MCNC: 80 MG/DL (ref 0–150)
TSH SERPL DL<=0.05 MIU/L-ACNC: 1.01 UIU/ML (ref 0.27–4.2)
VLDLC SERPL-MCNC: 15 MG/DL (ref 5–40)
WBC # BLD AUTO: 11.45 10*3/MM3 (ref 3.4–10.8)

## 2021-04-26 PROCEDURE — 80061 LIPID PANEL: CPT | Performed by: PHYSICIAN ASSISTANT

## 2021-04-26 PROCEDURE — 84443 ASSAY THYROID STIM HORMONE: CPT | Performed by: PHYSICIAN ASSISTANT

## 2021-04-26 PROCEDURE — 99214 OFFICE O/P EST MOD 30 MIN: CPT | Performed by: PHYSICIAN ASSISTANT

## 2021-04-26 PROCEDURE — 95251 CONT GLUC MNTR ANALYSIS I&R: CPT | Performed by: PHYSICIAN ASSISTANT

## 2021-04-26 PROCEDURE — 83036 HEMOGLOBIN GLYCOSYLATED A1C: CPT | Performed by: PHYSICIAN ASSISTANT

## 2021-04-26 PROCEDURE — 80053 COMPREHEN METABOLIC PANEL: CPT | Performed by: PHYSICIAN ASSISTANT

## 2021-04-26 PROCEDURE — 85025 COMPLETE CBC W/AUTO DIFF WBC: CPT | Performed by: PHYSICIAN ASSISTANT

## 2021-04-26 RX ORDER — INSULIN GLARGINE 100 [IU]/ML
INJECTION, SOLUTION SUBCUTANEOUS
Qty: 9 ML | Refills: 6 | Status: SHIPPED | OUTPATIENT
Start: 2021-04-26 | End: 2021-04-29 | Stop reason: CLARIF

## 2021-04-26 RX ORDER — INSULIN LISPRO 100 [IU]/ML
INJECTION, SOLUTION INTRAVENOUS; SUBCUTANEOUS
Qty: 18 ML | Refills: 6 | Status: SHIPPED | OUTPATIENT
Start: 2021-04-26 | End: 2021-09-20 | Stop reason: ALTCHOICE

## 2021-04-26 NOTE — PROGRESS NOTES
Chief Complaint  F/u for Diabetes Mellitus.    ABILIO Rowan is a 26 y.o. female who is here today for F/u of Diabetes Mellitus type 1. The initial diagnosis of diabetes was made 1/2018 when she was admitted with DKA. She was hospitalized for 2 weeks, on ventilator for part of that. Prior to that was having a lot of polydipsia.   Sister has DM1. Had GDM.     Reports hyperglycemia in the 200s for the past month.   Has been taking 20 units fast acting insulin and still does not seem to affect her sugar much.   BG in the mornings >260.  Has not been sick recently.   No steroids recently.  Has had rare nausea, no vomiting.  No n/v today.   Getting Tandem pump at the end of May.    Had eye exam in [place], not sure exactly where    A1c- 6.6 (10/6/2020), 6.3 (1/9/19), 6.3 (8/29/18), 6.8 (5/24/18), 7.6 (2/19/18), 9.5 (1/31/18)    Diabetic complications: peripheral neuropathy, numbness top of both feet, R>L, better since starting gabapentin  Eye exam current (within one year): no  Foot care and dental care: discussed    Current diabetic medications include:  Basaglar 10 U QHS  Admelog 20 units before meals  ACEI/ARB: no  Statin: no, <40 yoa    Past medications: gabapentin 100mg qd (didn't notice a difference off the medication)    Diabetic Monitoring  -   Dexcom downloaded and reviewed- 4% time in range, hyperglycemia >180 majority of the time, no hypoglycemia    Nutrition:     Current diet: on average, 2 meals per day plus a snack  Current exercise: none  Seen RD in past: no    The following portions of the patient's history were reviewed and updated by me as appropriate: allergies, current medications, past family history, past social history, past surgical history and problem list.      Past Medical History:   Diagnosis Date   • Acid reflux    • Diabetes mellitus type I (CMS/[facility])    • Migraine        Medications    Current Outpatient Medications:   •  B-D ULTRAFINE III SHORT PEN 31G X 8 MM misc, Use QID, Disp: 150  each, Rfl: 12  •  Blood Glucose Monitoring Suppl (TRUE METRIX METER) w/Device kit, TEST THREE TIMES DAILY, Disp: , Rfl: 0  •  Continuous Blood Gluc  (Dexcom G6 ) device, 1 each Continuous., Disp: 1 Device, Rfl: 0  •  Continuous Blood Gluc Sensor (Dexcom G6 Sensor), Every 10 (Ten) Days., Disp: 3 each, Rfl: 11  •  Continuous Blood Gluc Transmit (Dexcom G6 Transmitter) misc, 1 each Every 3 (Three) Months., Disp: 1 each, Rfl: 3  •  GlucaGen HypoKit 1 MG injection, Inject 1 mg under the skin into the appropriate area as directed As Needed for Low Blood Sugar., Disp: 1 mg, Rfl: 6  •  Insulin Glargine (BASAGLAR KWIKPEN) 100 UNIT/ML injection pen, Starting with 14 units qhs increase by 4 units q 3 days until fasting BG consistently <150, can increase up to 30 units, Disp: 9 mL, Rfl: 6  •  True Metrix Blood Glucose Test test strip, Test 4 times daily, Disp: 150 each, Rfl: 12  •  TRUEplus Safety Lancets 28G misc, Use 4x/day for testing, Disp: 150 each, Rfl: 12  •  HumaLOG KwikPen 100 UNIT/ML solution pen-injector, Take 20 units before meals TID, Disp: 18 mL, Rfl: 6    Review of Systems  Review of Systems   Constitutional: Negative for chills, fatigue, fever and unexpected weight change.   HENT: Negative for ear pain, hearing loss and nosebleeds.    Eyes: Negative for pain, discharge, redness, itching and visual disturbance.   Respiratory: Negative for shortness of breath and wheezing.    Cardiovascular: Negative for chest pain, palpitations and leg swelling.   Gastrointestinal: Negative for abdominal pain, blood in stool and diarrhea.   Endocrine: Negative for cold intolerance, heat intolerance and polydipsia.   Genitourinary: Negative for dysuria, frequency, hematuria, pelvic pain, vaginal bleeding and vaginal discharge.   Musculoskeletal: Negative for arthralgias, gait problem, joint swelling and myalgias.   Skin: Negative for rash.   Allergic/Immunologic: Negative.    Neurological: Negative for dizziness,  "syncope, weakness, numbness and headaches. Tremors:    Hematological: Negative for adenopathy. Does not bruise/bleed easily.   Psychiatric/Behavioral: Negative for sleep disturbance and suicidal ideas. The patient is not nervous/anxious.         Physical Exam    /82   Pulse 61   Temp 98.2 °F (36.8 °C)   Ht 167.6 cm (66\")   Wt 104 kg (228 lb 3.2 oz)   SpO2 97%   BMI 36.83 kg/m² Body mass index is 36.83 kg/m².  Physical Exam   Constitutional: She is oriented to person, place, and time. She appears well-developed. No distress.   AAOx3   HENT:   Head: Normocephalic and atraumatic.   Right Ear: External ear normal.   Left Ear: External ear normal.   Mouth/Throat: No oropharyngeal exudate.   Eyes: Conjunctivae and lids are normal. Right eye exhibits no discharge. Left eye exhibits no discharge. Right pupil is reactive. Left pupil is reactive.   Neck: No JVD present. No tracheal deviation present. No thyroid mass and no thyromegaly present.   Cardiovascular: Normal rate, regular rhythm and normal heart sounds.   No murmur heard.  Pulmonary/Chest: Effort normal and breath sounds normal. No respiratory distress. She has no wheezes.   Abdominal: Soft. Bowel sounds are normal. There is no abdominal tenderness.   Musculoskeletal: No tenderness.   Lymphadenopathy:     She has no cervical adenopathy.   Neurological: She is alert and oriented to person, place, and time.   Skin: Skin is warm and dry. No rash noted. She is not diaphoretic. No erythema. No pallor.   Psychiatric: Her speech is normal and behavior is normal. Judgment and thought content normal.         Labs and Imaging   Lab Results   Component Value Date    HGBA1C 10.2 04/26/2021    HGBA1C 7.8 01/22/2021    HGBA1C 6.6 10/06/2020     Office Visit on 04/26/2021   Component Date Value Ref Range Status   • Glucose 04/26/2021 256* 70 - 130 mg/dL Final   • Lot Number 04/26/2021 [id number]   Final   • Expiration Date 04/26/2021 11/23/2021   Final   • Hemoglobin " A1C 04/26/2021 10.2  % Final   • Lot Number 04/26/2021 [id number]   Final   • Expiration Date 04/26/2021 12/15/2022   Final     No images are attached to the encounter or orders placed in the encounter.  No Images in the past 120 days found..    Assessment / Plan   Diagnoses and all orders for this visit:    1. Uncontrolled type 1 diabetes mellitus with hyperglycemia (CMS/[facility]) (Primary)  -     POC Glucose, Blood  -     POC Glycosylated Hemoglobin (Hb A1C)  -     Insulin Glargine (BASAGLAR KWIKPEN) 100 UNIT/ML injection pen; Starting with 14 units qhs increase by 4 units q 3 days until fasting BG consistently <150, can increase up to 30 units  Dispense: 9 mL; Refill: 6  -     HumaLOG KwikPen 100 UNIT/ML solution pen-injector; Take 20 units before meals TID  Dispense: 18 mL; Refill: 6  -     Comprehensive Metabolic Panel  -     Lipid Panel  -     TSH  -     CBC & Differential        Diabetes Mellitus 1 under poor control  -s/p DKA 1/2018, this was the initial dx of DM1, was hospitalized x 2 weeks  -A1C 10.2, up from 7.8 last visit  -Dexcom downloaded and reviewed- 4% time in range, hyperglycemia >180 majority of the time, no hypoglycemia   -BG high over the last month without change in diet, no illness or steroids- may be out of honeymoon stage  -not pregnant - fiance had vasectomy   -increase basaglar by 4 units q2-3 days, until fasting BG consistently <150  -continue humalog 20 units before meals  -I will look at her dexcom download in 2 weeks for further insulin adjustments  -she is getting tandem pump at the end of may  -bg 256 during visit today- no n/v    1.  Diet: 3-4 carb servings per meal for females, 4-5 carb servings per meal for males  Spread carb intake throughout the day  Increase lean protein and vegetable intake  Avoid sugary drinks and processed carbs including crackers, cookies, cakes  2.  Exercise: Recommend at least 30 minutes of exercise daily, at least 5 days per week. Increase exercise  gradually.   3.  Blood Glucose Goal: Blood glucose goal <150 fasting, <180 2 hr postprandial  4.  Microalbumin due 7/2021  5.  Education performed during this visit: long term diabetic complications discussed. , annual eye examinations at Ophthalmology discussed, dental hygiene discussed  and foot care reviewed., home glucose monitoring emphasized, all medications, side effects and compliance discussed carefully and Hypoglycemia management and prevention reviewed. Reviewed ‘ABCs’ of diabetes management (respective goals in parentheses):  A1C (<7), blood pressure (<130/80), and cholesterol (LDL <100, if CVD <70).    There are no Patient Instructions on file for this visit.    Follow up: Return in about 2 months (around 6/26/2021).    Discussed the nature of the disease including, risks, complications, implications, management, safe and proper use of medications. Encouraged therapeutic lifestyle changes including low calorie diet with plenty of fruits and vegetables, daily exercise, medication compliance, and keeping scheduled follow up appointments with me and any other providers. Encouraged patient to have appointment for complete physical, fasting labs, appropriate screenings, and immunizations on an annual basis.        Anais Cee PA-C        .

## 2021-04-27 DIAGNOSIS — E10.9 TYPE 1 DIABETES MELLITUS WITHOUT COMPLICATION (HCC): ICD-10-CM

## 2021-04-27 RX ORDER — PROCHLORPERAZINE 25 MG/1
SUPPOSITORY RECTAL
Qty: 3 EACH | Refills: 11 | Status: SHIPPED | OUTPATIENT
Start: 2021-04-27 | End: 2022-02-02 | Stop reason: SDUPTHER

## 2021-04-27 RX ORDER — PROCHLORPERAZINE 25 MG/1
1 SUPPOSITORY RECTAL
Qty: 1 EACH | Refills: 3 | Status: SHIPPED | OUTPATIENT
Start: 2021-04-27 | End: 2022-02-14

## 2021-04-29 RX ORDER — INSULIN GLARGINE 100 [IU]/ML
INJECTION, SOLUTION SUBCUTANEOUS
Qty: 9 ML | Refills: 6 | Status: SHIPPED | OUTPATIENT
Start: 2021-04-29 | End: 2023-01-11

## 2021-05-11 ENCOUNTER — TELEPHONE (OUTPATIENT)
Dept: ENDOCRINOLOGY | Facility: CLINIC | Age: 26
End: 2021-05-11

## 2021-05-11 NOTE — TELEPHONE ENCOUNTER
Pt was informed to increase Lantus and says that her Humalog is about a month old and she keeps it in the fridge. She has not received her pump yet.

## 2021-05-11 NOTE — TELEPHONE ENCOUNTER
[facility] NEEDS SIGNATURE AND DATE ON THE T-SLIM PRESCRIPTION. THE ONE WE SENT THE SIGNATURE AND DATE WAS CUT OFF. FAX NUMBER [phone] PHONE NUMBER [phone]

## 2021-05-11 NOTE — TELEPHONE ENCOUNTER
PT was informed of blood sugar readings and goal. She states that she is taking Lantus 35 units qhs and Humalog 32 units ac meals

## 2021-05-11 NOTE — TELEPHONE ENCOUNTER
Please call pt.  Dexcom downloaded and reviewed. BG seems to be improving some. Time in range (70 to 180 BG) is 30%, which is up from 4% at her last visit. Goal is >70%.   Please confirm how much Basaglar/Latus and Humalog she is taking. Also has she received her insulin pump yet?

## 2021-05-25 ENCOUNTER — TELEPHONE (OUTPATIENT)
Dept: ENDOCRINOLOGY | Facility: CLINIC | Age: 26
End: 2021-05-25

## 2021-05-25 NOTE — TELEPHONE ENCOUNTER
PATIENT CALLED STATING THAT HER PUMP CAME IN TODAY. SHE WOULD LIKE TO BE ADVISED ON SETTING IT UP.    CALL BACK [phone]

## 2021-05-26 ENCOUNTER — TELEPHONE (OUTPATIENT)
Dept: ENDOCRINOLOGY | Facility: CLINIC | Age: 26
End: 2021-05-26

## 2021-05-26 NOTE — TELEPHONE ENCOUNTER
[facility] called and said they can't service for the patient at all because they don't accept her insurance, but they got a fax this morning asking for more information. Please advise.

## 2021-05-26 NOTE — TELEPHONE ENCOUNTER
I have faxed over all the information that they have requested, including a fax today. The pt has already received her pump so she must be using another company.

## 2021-09-20 ENCOUNTER — OFFICE VISIT (OUTPATIENT)
Dept: ENDOCRINOLOGY | Facility: CLINIC | Age: 26
End: 2021-09-20

## 2021-09-20 VITALS
HEART RATE: 104 BPM | OXYGEN SATURATION: 98 % | BODY MASS INDEX: 39.12 KG/M2 | SYSTOLIC BLOOD PRESSURE: 118 MMHG | HEIGHT: 66 IN | DIASTOLIC BLOOD PRESSURE: 80 MMHG | WEIGHT: 243.4 LBS

## 2021-09-20 DIAGNOSIS — Z46.81 INSULIN PUMP TITRATION: ICD-10-CM

## 2021-09-20 DIAGNOSIS — E10.9 TYPE 1 DIABETES MELLITUS WITHOUT COMPLICATION (HCC): Primary | Chronic | ICD-10-CM

## 2021-09-20 LAB
GLUCOSE BLDC GLUCOMTR-MCNC: 131 MG/DL (ref 70–130)
HBA1C MFR BLD: 7.5 %

## 2021-09-20 PROCEDURE — 95251 CONT GLUC MNTR ANALYSIS I&R: CPT | Performed by: PHYSICIAN ASSISTANT

## 2021-09-20 PROCEDURE — 99214 OFFICE O/P EST MOD 30 MIN: CPT | Performed by: PHYSICIAN ASSISTANT

## 2021-09-20 PROCEDURE — 83036 HEMOGLOBIN GLYCOSYLATED A1C: CPT | Performed by: PHYSICIAN ASSISTANT

## 2021-09-20 NOTE — PROGRESS NOTES
Chief Complaint  F/u for Diabetes Mellitus.    HPI   Suzanne Rowan is a 26 y.o. female who is here today for F/u of Diabetes Mellitus type 1. The initial diagnosis of diabetes was made 1/2018 when she was admitted with DKA. She was hospitalized for 2 weeks, on ventilator for part of that. Prior to that was having a lot of polydipsia.   Sister has DM1. Had GDM.     Started on tandem control IQ since last visit.  Is forgetting to put carbs in pump but when she was entering them had more hypoglycemia    Had eye exam in [facility]    Diabetic complications: peripheral neuropathy, numbness top of both feet, R>L, better since starting gabapentin  Eye exam current (within one year): no  Foot care and dental care: discussed    Current diabetic medications include:  Humalog via Tandem insulin pump    ACEI/ARB: no    Statin: no    Past medications: gabapentin 100mg qd (didn't notice a difference off the medication)    Diabetic Monitoring  -   Tandem pump/Dexcom downloaded and reviewed: Time in range 66%, basal 60%, bolus 15%, correction bolus 2%, control IQ bolus 15%, fasting blood sugar around 130, postprandial hyperglycemia due to missed boluses, no hypoglycemia    The following portions of the patient's history were reviewed and updated by me as appropriate: allergies, current medications, past family history, past social history, past surgical history and problem list.      Past Medical History:   Diagnosis Date   • Acid reflux    • Diabetes mellitus type I (CMS/[facility])    • Migraine        Medications    Current Outpatient Medications:   •  Blood Glucose Monitoring Suppl (TRUE METRIX METER) w/Device kit, TEST THREE TIMES DAILY, Disp: , Rfl: 0  •  Continuous Blood Gluc  (Dexcom G6 ) device, 1 each Continuous., Disp: 1 Device, Rfl: 0  •  Continuous Blood Gluc Sensor (Dexcom G6 Sensor), Every 10 (Ten) Days., Disp: 3 each, Rfl: 11  •  Continuous Blood Gluc Transmit (Dexcom G6  "Transmitter) misc, 1 each Every 3 (Three) Months., Disp: 1 each, Rfl: 3  •  GlucaGen HypoKit 1 MG injection, Inject 1 mg under the skin into the appropriate area as directed As Needed for Low Blood Sugar., Disp: 1 mg, Rfl: 6  •  insulin lispro (HumaLOG) 100 UNIT/ML injection, Use up to 95 units daily via insulin pump, Disp: 30 mL, Rfl: 6  •  Lantus SoloStar 100 UNIT/ML injection pen, Starting with 14 units qhs increase by 4 units q 3 days until fasting BG consistently <150, can increase up to 30 units, Disp: 9 mL, Rfl: 6  •  True Metrix Blood Glucose Test test strip, Test 4 times daily, Disp: 150 each, Rfl: 12  •  TRUEplus Safety Lancets 28G misc, Use 4x/day for testing, Disp: 150 each, Rfl: 12    Review of Systems  Review of Systems   Constitutional: Negative for chills, fatigue, fever and unexpected weight change.   HENT: Negative for ear pain, hearing loss and nosebleeds.    Eyes: Negative for pain, discharge, redness, itching and visual disturbance.   Respiratory: Negative for shortness of breath and wheezing.    Cardiovascular: Negative for chest pain, palpitations and leg swelling.   Gastrointestinal: Negative for abdominal pain, blood in stool and diarrhea.   Endocrine: Negative for cold intolerance, heat intolerance and polydipsia.   Genitourinary: Negative for dysuria, frequency, hematuria, pelvic pain, vaginal bleeding and vaginal discharge.   Musculoskeletal: Negative for arthralgias, gait problem, joint swelling and myalgias.   Skin: Negative for rash.   Allergic/Immunologic: Negative.    Neurological: Negative for dizziness, syncope, weakness, numbness and headaches. Tremors:    Hematological: Negative for adenopathy. Does not bruise/bleed easily.   Psychiatric/Behavioral: Negative for sleep disturbance and suicidal ideas. The patient is not nervous/anxious.         Physical Exam    /80   Pulse 104   Ht 167.6 cm (66\")   Wt 110 kg (243 lb 6.4 oz)   SpO2 98%   BMI 39.29 kg/m² Body mass index is " 39.29 kg/m².  Physical Exam   Constitutional: She is oriented to person, place, and time. She appears well-developed. No distress.   AAOx3   HENT:   Head: Normocephalic and atraumatic.   Right Ear: External ear normal.   Left Ear: External ear normal.   Mouth/Throat: No oropharyngeal exudate.   Eyes: Conjunctivae and lids are normal. Right eye exhibits no discharge. Left eye exhibits no discharge. Right pupil is reactive. Left pupil is reactive.   Neck: No JVD present. No tracheal deviation present. No thyroid mass and no thyromegaly present.   Cardiovascular: Normal rate, regular rhythm and normal heart sounds.   No murmur heard.  Pulmonary/Chest: Effort normal and breath sounds normal. No respiratory distress. She has no wheezes.   Abdominal: Soft. Bowel sounds are normal. There is no abdominal tenderness.   Musculoskeletal: No tenderness.   Lymphadenopathy:     She has no cervical adenopathy.   Neurological: She is alert and oriented to person, place, and time.   Skin: Skin is warm and dry. No rash noted. She is not diaphoretic. No erythema. No pallor.   Psychiatric: Her speech is normal and behavior is normal. Judgment and thought content normal.         Labs and Imaging   Lab Results   Component Value Date    HGBA1C 7.5 09/20/2021    HGBA1C 10.2 04/26/2021    HGBA1C 7.8 01/22/2021     Office Visit on 09/20/2021   Component Date Value Ref Range Status   • Glucose 09/20/2021 131* 70 - 130 mg/dL Final   • Hemoglobin A1C 09/20/2021 7.5  % Final     No images are attached to the encounter or orders placed in the encounter.  No Images in the past 120 days found..    Assessment / Plan   Diagnoses and all orders for this visit:    1. Type 1 diabetes mellitus without complication (CMS/[facility]) (Primary)  -     POC Glucose, Blood  -     POC Glycosylated Hemoglobin (Hb A1C)    2. Insulin pump titration        Diabetes Mellitus 1 under improved control  -A1C 7.5, down from 10.2 last visit  -Tandem pump/Dexcom downloaded and  reviewed: Time in range 66%, basal 60%, bolus 15%, correction bolus 2%, control IQ bolus 15%, fasting blood sugar around 130, postprandial hyperglycemia due to missed boluses, no hypoglycemia  -Patient understands she needs to start entering carbs to do 15 minutes before meals to avoid postprandial hyperglycemia and decreased risk of delayed hypoglycemia  -We can carb ratio slightly as patient reports she had more hypoglycemia when she was entering carbs before meals      1.  Diet: 3-4 carb servings per meal for females, 4-5 carb servings per meal for males  Spread carb intake throughout the day  Increase lean protein and vegetable intake  Avoid sugary drinks and processed carbs including crackers, cookies, cakes  2.  Exercise: Recommend at least 30 minutes of exercise daily, at least 5 days per week. Increase exercise gradually.   3.  Blood Glucose Goal: Blood glucose goal <150 fasting, <180 2 hr postprandial  4.  Microalbumin due 7/2021, unable to void today  5.  Education performed during this visit: long term diabetic complications discussed. , annual eye examinations at Ophthalmology discussed, dental hygiene discussed  and foot care reviewed., home glucose monitoring emphasized, all medications, side effects and compliance discussed carefully and Hypoglycemia management and prevention reviewed. Reviewed ‘ABCs’ of diabetes management (respective goals in parentheses):  A1C (<7), blood pressure (<130/80), and cholesterol (LDL <100, if CVD <70).    There are no Patient Instructions on file for this visit.    Follow up: Return in about 3 months (around 12/20/2021).    Discussed the nature of the disease including, risks, complications, implications, management, safe and proper use of medications. Encouraged therapeutic lifestyle changes including low calorie diet with plenty of fruits and vegetables, daily exercise, medication compliance, and keeping scheduled follow up appointments with me and any other  providers. Encouraged patient to have appointment for complete physical, fasting labs, appropriate screenings, and immunizations on an annual basis.        Anais Cee PA-C        .

## 2022-01-11 ENCOUNTER — OFFICE VISIT (OUTPATIENT)
Dept: ENDOCRINOLOGY | Facility: CLINIC | Age: 27
End: 2022-01-11

## 2022-01-11 ENCOUNTER — LAB (OUTPATIENT)
Dept: LAB | Facility: HOSPITAL | Age: 27
End: 2022-01-11

## 2022-01-11 VITALS
OXYGEN SATURATION: 97 % | WEIGHT: 236.8 LBS | HEART RATE: 99 BPM | DIASTOLIC BLOOD PRESSURE: 80 MMHG | SYSTOLIC BLOOD PRESSURE: 120 MMHG | BODY MASS INDEX: 38.06 KG/M2 | HEIGHT: 66 IN

## 2022-01-11 DIAGNOSIS — Z46.81 INSULIN PUMP TITRATION: ICD-10-CM

## 2022-01-11 DIAGNOSIS — E10.9 TYPE 1 DIABETES MELLITUS WITHOUT COMPLICATION: Primary | Chronic | ICD-10-CM

## 2022-01-11 DIAGNOSIS — E16.2 HYPOGLYCEMIA: ICD-10-CM

## 2022-01-11 LAB
EXPIRATION DATE: NORMAL
GLUCOSE BLDC GLUCOMTR-MCNC: 206 MG/DL (ref 70–130)
HBA1C MFR BLD: 7.2 %
Lab: NORMAL

## 2022-01-11 PROCEDURE — 80061 LIPID PANEL: CPT | Performed by: PHYSICIAN ASSISTANT

## 2022-01-11 PROCEDURE — 82570 ASSAY OF URINE CREATININE: CPT | Performed by: PHYSICIAN ASSISTANT

## 2022-01-11 PROCEDURE — 84443 ASSAY THYROID STIM HORMONE: CPT | Performed by: PHYSICIAN ASSISTANT

## 2022-01-11 PROCEDURE — 83036 HEMOGLOBIN GLYCOSYLATED A1C: CPT | Performed by: PHYSICIAN ASSISTANT

## 2022-01-11 PROCEDURE — 95251 CONT GLUC MNTR ANALYSIS I&R: CPT | Performed by: PHYSICIAN ASSISTANT

## 2022-01-11 PROCEDURE — 99214 OFFICE O/P EST MOD 30 MIN: CPT | Performed by: PHYSICIAN ASSISTANT

## 2022-01-11 PROCEDURE — 3051F HG A1C>EQUAL 7.0%<8.0%: CPT | Performed by: PHYSICIAN ASSISTANT

## 2022-01-11 PROCEDURE — 80053 COMPREHEN METABOLIC PANEL: CPT | Performed by: PHYSICIAN ASSISTANT

## 2022-01-11 PROCEDURE — 82043 UR ALBUMIN QUANTITATIVE: CPT | Performed by: PHYSICIAN ASSISTANT

## 2022-01-11 RX ORDER — GLUCAGON HYDROCHLORIDE 1 MG
1 KIT INJECTION AS NEEDED
Qty: 1 EACH | Refills: 11 | Status: SHIPPED | OUTPATIENT
Start: 2022-01-11 | End: 2022-09-06 | Stop reason: SDUPTHER

## 2022-01-11 NOTE — PROGRESS NOTES
Chief Complaint  F/u for Diabetes Mellitus.    HPI   Suzanne Rowan is a 26 y.o. female who is here today for F/u of Diabetes Mellitus type 1. The initial diagnosis of diabetes was made 1/2018.    Started a part time job. Likes it.     Diabetic complications: peripheral neuropathy, numbness top of both feet, R>L, better since starting gabapentin  Eye exam current (within one year): utd   Foot care and dental care: discussed    Current diabetic medications include:  Humalog via Tandem insulin pump    ACEI/ARB: no    Statin: no    Past medications: gabapentin 100mg qd (didn't notice a difference off the medication)    Diabetic Monitoring  -   Tandem pump/Dexcom downloaded and reviewed: Time in range 81%, basal 84%, bolus 3%, correction bolus 1%, control IQ bolus 12%, fasting blood sugar around ~100, postprandial hyperglycemia in the 200s due to missed boluses, near hypoglycemia after some control IQ corrections    The following portions of the patient's history were reviewed and updated by me as appropriate: allergies, current medications, past family history, past social history, past surgical history and problem list.      Past Medical History:   Diagnosis Date   • Acid reflux    • Diabetes mellitus type I (HCC)    • Migraine        Medications    Current Outpatient Medications:   •  Blood Glucose Monitoring Suppl (TRUE METRIX METER) w/Device kit, TEST THREE TIMES DAILY, Disp: , Rfl: 0  •  Continuous Blood Gluc  (Dexcom G6 ) device, 1 each Continuous., Disp: 1 Device, Rfl: 0  •  Continuous Blood Gluc Sensor (Dexcom G6 Sensor), Every 10 (Ten) Days., Disp: 3 each, Rfl: 11  •  Continuous Blood Gluc Transmit (Dexcom G6 Transmitter) misc, 1 each Every 3 (Three) Months., Disp: 1 each, Rfl: 3  •  GlucaGen HypoKit 1 MG injection, Inject 1 mg under the skin into the appropriate area as directed As Needed for Low Blood Sugar., Disp: 1 each, Rfl: 11  •  insulin lispro (HumaLOG) 100 UNIT/ML injection, Use up to  "95 units daily via insulin pump, Disp: 30 mL, Rfl: 6  •  Lantus SoloStar 100 UNIT/ML injection pen, Starting with 14 units qhs increase by 4 units q 3 days until fasting BG consistently <150, can increase up to 30 units, Disp: 9 mL, Rfl: 6  •  True Metrix Blood Glucose Test test strip, Test 4 times daily, Disp: 150 each, Rfl: 12  •  TRUEplus Safety Lancets 28G misc, Use 4x/day for testing, Disp: 150 each, Rfl: 12    Review of Systems  Review of Systems   Constitutional: Negative for chills, fatigue, fever and unexpected weight change.   HENT: Negative for ear pain, hearing loss and nosebleeds.    Eyes: Negative for pain, discharge, redness, itching and visual disturbance.   Respiratory: Negative for shortness of breath and wheezing.    Cardiovascular: Negative for chest pain, palpitations and leg swelling.   Gastrointestinal: Negative for abdominal pain, blood in stool and diarrhea.   Endocrine: Negative for cold intolerance, heat intolerance and polydipsia.   Genitourinary: Negative for dysuria, frequency, hematuria, pelvic pain, vaginal bleeding and vaginal discharge.   Musculoskeletal: Negative for arthralgias, gait problem, joint swelling and myalgias.   Skin: Negative for rash.   Allergic/Immunologic: Negative.    Neurological: Negative for dizziness, syncope, weakness, numbness and headaches. Tremors:    Hematological: Negative for adenopathy. Does not bruise/bleed easily.   Psychiatric/Behavioral: Negative for sleep disturbance and suicidal ideas. The patient is not nervous/anxious.         Physical Exam    /80   Pulse 99   Ht 167.6 cm (66\")   Wt 107 kg (236 lb 12.8 oz)   SpO2 97%   BMI 38.22 kg/m² Body mass index is 38.22 kg/m².  Physical Exam   Constitutional: She is oriented to person, place, and time. She appears well-developed. No distress.   AAOx3   HENT:   Head: Normocephalic and atraumatic.   Right Ear: External ear normal.   Left Ear: External ear normal.   Mouth/Throat: No oropharyngeal " exudate.   Eyes: Conjunctivae and lids are normal. Right eye exhibits no discharge. Left eye exhibits no discharge. Right pupil is reactive. Left pupil is reactive.   Neck: No JVD present. No tracheal deviation present. No thyroid mass and no thyromegaly present.   Cardiovascular: Normal rate, regular rhythm and normal heart sounds.   No murmur heard.  Pulmonary/Chest: Effort normal and breath sounds normal. No respiratory distress. She has no wheezes.   Abdominal: Soft. Bowel sounds are normal. There is no abdominal tenderness.   Musculoskeletal: No tenderness.    Suzanne had a diabetic foot exam performed today.   During the foot exam she had a monofilament test performed.    Neurological Sensory Findings - Unaltered hot/cold right ankle/foot discrimination and unaltered hot/cold left ankle/foot discrimination. Unaltered sharp/dull right ankle/foot discrimination and unaltered sharp/dull left ankle/foot discrimination. No right ankle/foot altered proprioception and no left ankle/foot altered proprioception  Vascular Status -  Her right foot exhibits normal foot vasculature  and no edema. Her left foot exhibits normal foot vasculature  and no edema.  Skin Integrity  -  Her right foot skin is intact.  She has no right foot onychomycosis, no right foot ulcer and non-callous right foot.Her left foot skin is intact. She has no left foot onychomycosis, no left foot ulcer and non-callous left foot..  Lymphadenopathy:     She has no cervical adenopathy.   Neurological: She is alert and oriented to person, place, and time.   Skin: Skin is warm and dry. No rash noted. She is not diaphoretic. No erythema. No pallor.   Psychiatric: Her speech is normal and behavior is normal. Judgment and thought content normal.         Labs and Imaging   Lab Results   Component Value Date    HGBA1C 7.2 01/11/2022    HGBA1C 7.5 09/20/2021    HGBA1C 10.2 04/26/2021     Office Visit on 01/11/2022   Component Date Value Ref Range Status   •  Glucose 01/11/2022 206* 70 - 130 mg/dL Final   • Hemoglobin A1C 01/11/2022 7.2  % Final   • Lot Number 01/11/2022 [id number]   Final   • Expiration Date 01/11/2022 [id number]   Final         Assessment / Plan   Diagnoses and all orders for this visit:    1. Type 1 diabetes mellitus without complication (HCC) (Primary)  -     POC Glucose, Blood  -     POC Glycosylated Hemoglobin (Hb A1C)  -     Microalbumin / Creatinine Urine Ratio - Urine, Clean Catch  -     Comprehensive Metabolic Panel  -     Lipid Panel  -     TSH    2. Insulin pump titration    3. Hypoglycemia  -     GlucaGen HypoKit 1 MG injection; Inject 1 mg under the skin into the appropriate area as directed As Needed for Low Blood Sugar.  Dispense: 1 each; Refill: 11        Diabetes Mellitus 1 under improved control  -A1C 7.2  -Tandem pump/Dexcom downloaded and reviewed: Time in range 81%, basal 84%, bolus 3%, correction bolus 1%, control IQ bolus 12%, fasting blood sugar around ~100, postprandial hyperglycemia in the 200s due to missed boluses, near hypoglycemia after some control IQ corrections  -Patient understands she needs to start entering carbs 15 minutes before meals to avoid postprandial hyperglycemia and decreased risk of delayed hypoglycemia  -weaken correction slightly to avoid near hypoglycemia after control IQ corrections  -foot exam and labs updated today      1.  Diet: 3-4 carb servings per meal for females, 4-5 carb servings per meal for males  Spread carb intake throughout the day  Increase lean protein and vegetable intake  Avoid sugary drinks and processed carbs including crackers, cookies, cakes  2.  Exercise: Recommend at least 30 minutes of exercise daily, at least 5 days per week. Increase exercise gradually.   3.  Blood Glucose Goal: Blood glucose goal <150 fasting, <180 2 hr postprandial  4.  Microalbumin due 7/2021  5.  Education performed during this visit: long term diabetic complications discussed. , annual eye examinations at  Ophthalmology discussed, dental hygiene discussed  and foot care reviewed., home glucose monitoring emphasized, all medications, side effects and compliance discussed carefully and Hypoglycemia management and prevention reviewed. Reviewed ‘ABCs’ of diabetes management (respective goals in parentheses):  A1C (<7), blood pressure (<130/80), and cholesterol (LDL <100, if CVD <70).    There are no Patient Instructions on file for this visit.    Follow up: Return in about 3 months (around 4/11/2022).    Discussed the nature of the disease including, risks, complications, implications, management, safe and proper use of medications. Encouraged therapeutic lifestyle changes including low calorie diet with plenty of fruits and vegetables, daily exercise, medication compliance, and keeping scheduled follow up appointments with me and any other providers. Encouraged patient to have appointment for complete physical, fasting labs, appropriate screenings, and immunizations on an annual basis.        Anais Cee PA-C        .

## 2022-01-12 LAB
ALBUMIN SERPL-MCNC: 4.6 G/DL (ref 3.5–5.2)
ALBUMIN UR-MCNC: <1.2 MG/DL
ALBUMIN/GLOB SERPL: 1.4 G/DL
ALP SERPL-CCNC: 79 U/L (ref 39–117)
ALT SERPL W P-5'-P-CCNC: 23 U/L (ref 1–33)
ANION GAP SERPL CALCULATED.3IONS-SCNC: 12.6 MMOL/L (ref 5–15)
AST SERPL-CCNC: 13 U/L (ref 1–32)
BILIRUB SERPL-MCNC: 0.2 MG/DL (ref 0–1.2)
BUN SERPL-MCNC: 8 MG/DL (ref 6–20)
BUN/CREAT SERPL: 12.1 (ref 7–25)
CALCIUM SPEC-SCNC: 9.8 MG/DL (ref 8.6–10.5)
CHLORIDE SERPL-SCNC: 100 MMOL/L (ref 98–107)
CHOLEST SERPL-MCNC: 187 MG/DL (ref 0–200)
CO2 SERPL-SCNC: 25.4 MMOL/L (ref 22–29)
CREAT SERPL-MCNC: 0.66 MG/DL (ref 0.57–1)
CREAT UR-MCNC: 67.5 MG/DL
GFR SERPL CREATININE-BSD FRML MDRD: 109 ML/MIN/1.73
GLOBULIN UR ELPH-MCNC: 3.2 GM/DL
GLUCOSE SERPL-MCNC: 165 MG/DL (ref 65–99)
HDLC SERPL-MCNC: 35 MG/DL (ref 40–60)
LDLC SERPL CALC-MCNC: 128 MG/DL (ref 0–100)
LDLC/HDLC SERPL: 3.57 {RATIO}
MICROALBUMIN/CREAT UR: NORMAL MG/G{CREAT}
POTASSIUM SERPL-SCNC: 3.9 MMOL/L (ref 3.5–5.2)
PROT SERPL-MCNC: 7.8 G/DL (ref 6–8.5)
SODIUM SERPL-SCNC: 138 MMOL/L (ref 136–145)
TRIGL SERPL-MCNC: 135 MG/DL (ref 0–150)
TSH SERPL DL<=0.05 MIU/L-ACNC: 1.11 UIU/ML (ref 0.27–4.2)
VLDLC SERPL-MCNC: 24 MG/DL (ref 5–40)

## 2022-02-02 DIAGNOSIS — E10.9 TYPE 1 DIABETES MELLITUS WITHOUT COMPLICATION: ICD-10-CM

## 2022-02-02 RX ORDER — PROCHLORPERAZINE 25 MG/1
SUPPOSITORY RECTAL
Qty: 3 EACH | Refills: 11 | Status: SHIPPED | OUTPATIENT
Start: 2022-02-02 | End: 2022-05-17 | Stop reason: SDUPTHER

## 2022-02-13 DIAGNOSIS — E10.9 TYPE 1 DIABETES MELLITUS WITHOUT COMPLICATION: ICD-10-CM

## 2022-02-14 RX ORDER — PROCHLORPERAZINE 25 MG/1
1 SUPPOSITORY RECTAL
Qty: 1 EACH | Refills: 0 | Status: SHIPPED | OUTPATIENT
Start: 2022-02-14 | End: 2022-05-17 | Stop reason: SDUPTHER

## 2022-05-17 DIAGNOSIS — E10.9 TYPE 1 DIABETES MELLITUS WITHOUT COMPLICATION: ICD-10-CM

## 2022-05-17 RX ORDER — PROCHLORPERAZINE 25 MG/1
1 SUPPOSITORY RECTAL
Qty: 1 EACH | Refills: 0 | Status: SHIPPED | OUTPATIENT
Start: 2022-05-17 | End: 2022-06-06

## 2022-05-17 RX ORDER — PROCHLORPERAZINE 25 MG/1
SUPPOSITORY RECTAL
Qty: 3 EACH | Refills: 11 | Status: SHIPPED | OUTPATIENT
Start: 2022-05-17 | End: 2022-09-06 | Stop reason: SDUPTHER

## 2022-06-06 DIAGNOSIS — E10.9 TYPE 1 DIABETES MELLITUS WITHOUT COMPLICATION: ICD-10-CM

## 2022-06-06 RX ORDER — PROCHLORPERAZINE 25 MG/1
1 SUPPOSITORY RECTAL
Qty: 1 EACH | Refills: 0 | Status: SHIPPED | OUTPATIENT
Start: 2022-06-06 | End: 2022-09-06

## 2022-09-06 DIAGNOSIS — E10.9 TYPE 1 DIABETES MELLITUS WITHOUT COMPLICATION: ICD-10-CM

## 2022-09-06 DIAGNOSIS — E16.2 HYPOGLYCEMIA: ICD-10-CM

## 2022-09-06 RX ORDER — PROCHLORPERAZINE 25 MG/1
SUPPOSITORY RECTAL
Qty: 3 EACH | Refills: 11 | Status: SHIPPED | OUTPATIENT
Start: 2022-09-06

## 2022-09-06 RX ORDER — PROCHLORPERAZINE 25 MG/1
1 SUPPOSITORY RECTAL
Qty: 1 EACH | Refills: 3 | Status: SHIPPED | OUTPATIENT
Start: 2022-09-06 | End: 2023-01-11

## 2022-09-06 RX ORDER — PROCHLORPERAZINE 25 MG/1
1 SUPPOSITORY RECTAL
Qty: 1 EACH | Refills: 3 | Status: SHIPPED | OUTPATIENT
Start: 2022-09-06 | End: 2023-03-14 | Stop reason: SDUPTHER

## 2022-09-06 RX ORDER — GLUCAGON HYDROCHLORIDE 1 MG
1 KIT INJECTION AS NEEDED
Qty: 1 EACH | Refills: 11 | Status: SHIPPED | OUTPATIENT
Start: 2022-09-06

## 2022-09-06 NOTE — TELEPHONE ENCOUNTER
Transmitter was E-scripted today  Sensors 5/17/22 with 11 refills  Glucogon 1/11/22 with 11 refills  The only RX that may be due is Insulin.  Unable to dc and pend at same time.

## 2022-09-07 ENCOUNTER — PRIOR AUTHORIZATION (OUTPATIENT)
Dept: ENDOCRINOLOGY | Facility: CLINIC | Age: 27
End: 2022-09-07

## 2022-09-14 ENCOUNTER — PRIOR AUTHORIZATION (OUTPATIENT)
Dept: ENDOCRINOLOGY | Facility: CLINIC | Age: 27
End: 2022-09-14

## 2022-09-14 NOTE — TELEPHONE ENCOUNTER
Called [facility] to follow up on the pts Dexcom transmitter PA. Spoke with a representative and she stated that the PA was approved on 09/07/22. The lady said that she is going to fax over the approval as well. Pt is informed.

## 2022-11-21 ENCOUNTER — TELEPHONE (OUTPATIENT)
Dept: ENDOCRINOLOGY | Facility: CLINIC | Age: 27
End: 2022-11-21

## 2022-11-21 DIAGNOSIS — E10.65 UNCONTROLLED TYPE 1 DIABETES MELLITUS WITH HYPERGLYCEMIA: ICD-10-CM

## 2022-11-21 DIAGNOSIS — E10.9 TYPE 1 DIABETES MELLITUS WITHOUT COMPLICATION: Primary | ICD-10-CM

## 2022-11-21 RX ORDER — INSULIN PMP CART,AUT,G6/7,CNTR
1 EACH SUBCUTANEOUS
Qty: 1 KIT | Refills: 0 | Status: SHIPPED | OUTPATIENT
Start: 2022-11-21 | End: 2023-01-11

## 2022-11-21 NOTE — TELEPHONE ENCOUNTER
Patient sent message stating she inquired with omnipod to see if her insurance covers it. I received a fax from [facility] pharmacies asking for a rx for either the Omnipod 5 or Dash.     Do you want to try the omnipod 5 through pharmacy?    Was going to try and go through pharmacy instead of this company if you want her on the omnipod 5.

## 2022-12-02 ENCOUNTER — PATIENT MESSAGE (OUTPATIENT)
Dept: ENDOCRINOLOGY | Facility: CLINIC | Age: 27
End: 2022-12-02

## 2022-12-02 DIAGNOSIS — E10.65 UNCONTROLLED TYPE 1 DIABETES MELLITUS WITH HYPERGLYCEMIA: ICD-10-CM

## 2022-12-02 DIAGNOSIS — E10.9 TYPE 1 DIABETES MELLITUS WITHOUT COMPLICATION: ICD-10-CM

## 2022-12-02 RX ORDER — PROCHLORPERAZINE 25 MG/1
1 SUPPOSITORY RECTAL CONTINUOUS
Qty: 1 EACH | Refills: 0 | Status: SHIPPED | OUTPATIENT
Start: 2022-12-02

## 2022-12-02 RX ORDER — CALCIUM CITRATE/VITAMIN D3 200MG-6.25
TABLET ORAL
Qty: 150 EACH | Refills: 12 | Status: SHIPPED | OUTPATIENT
Start: 2022-12-02 | End: 2023-03-14 | Stop reason: SDUPTHER

## 2022-12-02 RX ORDER — PROCHLORPERAZINE 25 MG/1
1 SUPPOSITORY RECTAL CONTINUOUS
Qty: 1 EACH | Refills: 0 | Status: SHIPPED | OUTPATIENT
Start: 2022-12-02 | End: 2022-12-02 | Stop reason: SDUPTHER

## 2022-12-06 ENCOUNTER — TELEPHONE (OUTPATIENT)
Dept: ENDOCRINOLOGY | Facility: CLINIC | Age: 27
End: 2022-12-06

## 2022-12-16 ENCOUNTER — TELEPHONE (OUTPATIENT)
Dept: ENDOCRINOLOGY | Facility: CLINIC | Age: 27
End: 2022-12-16

## 2022-12-16 NOTE — TELEPHONE ENCOUNTER
john called from [facility] pharmacy. They have been trying to fax us an order for omnipod 5 and supplies. She is resending today. Please check later.     Phone: [phone]  Fax: [phone]

## 2023-01-11 ENCOUNTER — OFFICE VISIT (OUTPATIENT)
Dept: ENDOCRINOLOGY | Facility: CLINIC | Age: 28
End: 2023-01-11
Payer: MEDICAID

## 2023-01-11 VITALS
BODY MASS INDEX: 38.89 KG/M2 | SYSTOLIC BLOOD PRESSURE: 126 MMHG | OXYGEN SATURATION: 96 % | HEART RATE: 86 BPM | WEIGHT: 242 LBS | DIASTOLIC BLOOD PRESSURE: 72 MMHG | HEIGHT: 66 IN

## 2023-01-11 DIAGNOSIS — E10.65 UNCONTROLLED TYPE 1 DIABETES MELLITUS WITH HYPERGLYCEMIA: Primary | Chronic | ICD-10-CM

## 2023-01-11 LAB
ALBUMIN UR-MCNC: <1.2 MG/DL
CREAT UR-MCNC: 44 MG/DL
EXPIRATION DATE: ABNORMAL
EXPIRATION DATE: NORMAL
GLUCOSE BLDC GLUCOMTR-MCNC: 259 MG/DL (ref 70–130)
HBA1C MFR BLD: 7.8 %
Lab: ABNORMAL
Lab: NORMAL
MICROALBUMIN/CREAT UR: NORMAL MG/G{CREAT}

## 2023-01-11 PROCEDURE — 84443 ASSAY THYROID STIM HORMONE: CPT | Performed by: PHYSICIAN ASSISTANT

## 2023-01-11 PROCEDURE — 3051F HG A1C>EQUAL 7.0%<8.0%: CPT | Performed by: PHYSICIAN ASSISTANT

## 2023-01-11 PROCEDURE — 82043 UR ALBUMIN QUANTITATIVE: CPT | Performed by: PHYSICIAN ASSISTANT

## 2023-01-11 PROCEDURE — 80053 COMPREHEN METABOLIC PANEL: CPT | Performed by: PHYSICIAN ASSISTANT

## 2023-01-11 PROCEDURE — 95251 CONT GLUC MNTR ANALYSIS I&R: CPT | Performed by: PHYSICIAN ASSISTANT

## 2023-01-11 PROCEDURE — 99213 OFFICE O/P EST LOW 20 MIN: CPT | Performed by: PHYSICIAN ASSISTANT

## 2023-01-11 PROCEDURE — 80061 LIPID PANEL: CPT | Performed by: PHYSICIAN ASSISTANT

## 2023-01-11 PROCEDURE — 83036 HEMOGLOBIN GLYCOSYLATED A1C: CPT | Performed by: PHYSICIAN ASSISTANT

## 2023-01-11 PROCEDURE — 82570 ASSAY OF URINE CREATININE: CPT | Performed by: PHYSICIAN ASSISTANT

## 2023-01-11 NOTE — PROGRESS NOTES
Chief Complaint  F/u for Diabetes Mellitus.    HPI   Suzanne Rowan is a 27 y.o. female who is here today for F/u of Diabetes Mellitus type 1. The initial diagnosis of diabetes was made 1/2018.    Last appt 1/11/2022.  Diabetes education spoke to pt about switching to OmniPod 5 though she wanted to stick with Tandem.     Diabetic complications: peripheral neuropathy, numbness top of both feet, R>L, better since starting gabapentin    Current diabetic medications include:  Humalog via Tandem insulin pump    ACEI/ARB: no    Statin: no    Past medications: gabapentin 100mg qd (didn't notice a difference off the medication)    Diabetic Monitoring  -   Tandem pump/Dexcom downloaded and reviewed: Time in range 53%, basal 77%, bolus 5%, correction bolus 2%, control IQ bolus 16%, fasting blood sugar 110-150, frequent postprandial hyperglycemia in the 200s due to missed boluses, no hypoglycemia    The following portions of the patient's history were reviewed and updated by me as appropriate: allergies, current medications, past family history, past social history, past surgical history and problem list.    Past Medical History:   Diagnosis Date   • Acid reflux    • Diabetes mellitus type I (HCC)    • Gestational diabetes 2014    Not sure the exact date   • Migraine        Medications    Current Outpatient Medications:   •  Blood Glucose Monitoring Suppl (TRUE METRIX METER) w/Device kit, TEST THREE TIMES DAILY, Disp: , Rfl: 0  •  Continuous Blood Gluc  (Dexcom G6 ) device, 1 each Continuous., Disp: 1 each, Rfl: 0  •  Continuous Blood Gluc Sensor (Dexcom G6 Sensor), Every 10 (Ten) Days., Disp: 3 each, Rfl: 11  •  Continuous Blood Gluc Transmit (Dexcom G6 Transmitter) misc, 1 EACH EVERY 3 (THREE) MONTHS., Disp: 1 each, Rfl: 3  •  GlucaGen HypoKit 1 MG injection, Inject 1 mg under the skin into the appropriate area as directed As Needed for Low Blood Sugar., Disp: 1 each, Rfl: 11  •  insulin lispro (HumaLOG) 100  "UNIT/ML injection, Use up to 95 units daily via insulin pump, Disp: 30 mL, Rfl: 6  •  True Metrix Blood Glucose Test test strip, Test 4 times daily, Disp: 150 each, Rfl: 12  •  TRUEplus Safety Lancets 28G misc, Use 4x/day for testing, Disp: 150 each, Rfl: 12    Review of Systems  Review of Systems   All other systems reviewed and are negative.       Physical Exam    /72   Pulse 86   Ht 167.6 cm (66\")   Wt 110 kg (242 lb)   SpO2 96%   BMI 39.06 kg/m² Body mass index is 39.06 kg/m².  Physical Exam   Constitutional: She is oriented to person, place, and time. She appears well-developed. No distress.   AAOx3   HENT:   Head: Normocephalic and atraumatic.   Right Ear: External ear normal.   Left Ear: External ear normal.   Mouth/Throat: No oropharyngeal exudate.   Eyes: Conjunctivae and lids are normal. Right eye exhibits no discharge. Left eye exhibits no discharge. Right pupil is reactive. Left pupil is reactive.   Neck: No JVD present. No tracheal deviation present. No thyroid mass and no thyromegaly present.   Cardiovascular: Normal rate, regular rhythm and normal heart sounds.   No murmur heard.  Pulmonary/Chest: Effort normal and breath sounds normal. No respiratory distress. She has no wheezes.   Musculoskeletal: No tenderness.    Suzanne had a diabetic foot exam performed today.   During the foot exam she had a monofilament test performed.    Neurological Sensory Findings - Unaltered hot/cold right ankle/foot discrimination and unaltered hot/cold left ankle/foot discrimination. Unaltered sharp/dull right ankle/foot discrimination and unaltered sharp/dull left ankle/foot discrimination. No right ankle/foot altered proprioception and no left ankle/foot altered proprioception  Vascular Status -  Her right foot exhibits normal foot vasculature  and no edema. Her left foot exhibits normal foot vasculature  and no edema.  Skin Integrity  -  Her right foot skin is intact.  She has no right foot " onychomycosis, no right foot ulcer and non-callous right foot.Her left foot skin is intact. She has no left foot onychomycosis, no left foot ulcer and non-callous left foot..  Lymphadenopathy:     She has no cervical adenopathy.   Neurological: She is alert and oriented to person, place, and time.   Skin: Skin is warm and dry. No rash noted. She is not diaphoretic. No erythema. No pallor.   Psychiatric: Her speech is normal and behavior is normal. Judgment and thought content normal.         Labs and Imaging   Lab Results   Component Value Date    HGBA1C 7.8 01/11/2023    HGBA1C 7.2 01/11/2022    HGBA1C 7.5 09/20/2021     Office Visit on 01/11/2023   Component Date Value Ref Range Status   • Hemoglobin A1C 01/11/2023 7.8  % Final   • Lot Number 01/11/2023 [id number]   Final   • Expiration Date 01/11/2023 10-   Final   • Glucose 01/11/2023 259 (A)  70 - 130 mg/dL Final   • Lot Number 01/11/2023 [id number]   Final   • Expiration Date 01/11/2023 07-   Final         Assessment / Plan   Diagnoses and all orders for this visit:    1. Uncontrolled type 1 diabetes mellitus with hyperglycemia (HCC) (Primary)  -     POC Glycosylated Hemoglobin (Hb A1C)  -     POC Glucose, Blood  -     Microalbumin / Creatinine Urine Ratio - Urine, Clean Catch  -     Comprehensive Metabolic Panel  -     Lipid Panel  -     TSH        Diabetes Mellitus 1 under improved control  -A1C 7.8 though BG readings are higher than A1c suggests  -57% time in range, down from 81% last visit  -frequent postprandial hyperglycemia in the 200s due to missed boluses  -Discussed again need to bolus before all meals, suggested adding 5 g carbs for every 8 ounces black coffee  -no pump setting changes  -foot exam and labs updated today    There are no Patient Instructions on file for this visit.    Follow up: Return in about 3 months (around 4/11/2023).    Anais Cee PA-C        .

## 2023-01-12 LAB
ALBUMIN SERPL-MCNC: 4.1 G/DL (ref 3.5–5.2)
ALBUMIN/GLOB SERPL: 1.2 G/DL
ALP SERPL-CCNC: 68 U/L (ref 39–117)
ALT SERPL W P-5'-P-CCNC: 16 U/L (ref 1–33)
ANION GAP SERPL CALCULATED.3IONS-SCNC: 9.8 MMOL/L (ref 5–15)
AST SERPL-CCNC: 11 U/L (ref 1–32)
BILIRUB SERPL-MCNC: <0.2 MG/DL (ref 0–1.2)
BUN SERPL-MCNC: 7 MG/DL (ref 6–20)
BUN/CREAT SERPL: 11.3 (ref 7–25)
CALCIUM SPEC-SCNC: 9.2 MG/DL (ref 8.6–10.5)
CHLORIDE SERPL-SCNC: 102 MMOL/L (ref 98–107)
CHOLEST SERPL-MCNC: 159 MG/DL (ref 0–200)
CO2 SERPL-SCNC: 26.2 MMOL/L (ref 22–29)
CREAT SERPL-MCNC: 0.62 MG/DL (ref 0.57–1)
EGFRCR SERPLBLD CKD-EPI 2021: 126.1 ML/MIN/1.73
GLOBULIN UR ELPH-MCNC: 3.3 GM/DL
GLUCOSE SERPL-MCNC: 190 MG/DL (ref 65–99)
HDLC SERPL-MCNC: 33 MG/DL (ref 40–60)
LDLC SERPL CALC-MCNC: 103 MG/DL (ref 0–100)
LDLC/HDLC SERPL: 3.05 {RATIO}
POTASSIUM SERPL-SCNC: 4 MMOL/L (ref 3.5–5.2)
PROT SERPL-MCNC: 7.4 G/DL (ref 6–8.5)
SODIUM SERPL-SCNC: 138 MMOL/L (ref 136–145)
TRIGL SERPL-MCNC: 127 MG/DL (ref 0–150)
TSH SERPL DL<=0.05 MIU/L-ACNC: 0.96 UIU/ML (ref 0.27–4.2)
VLDLC SERPL-MCNC: 23 MG/DL (ref 5–40)

## 2023-03-14 DIAGNOSIS — E10.9 TYPE 1 DIABETES MELLITUS WITHOUT COMPLICATION: ICD-10-CM

## 2023-03-14 DIAGNOSIS — E10.65 UNCONTROLLED TYPE 1 DIABETES MELLITUS WITH HYPERGLYCEMIA: ICD-10-CM

## 2023-03-15 RX ORDER — PROCHLORPERAZINE 25 MG/1
1 SUPPOSITORY RECTAL
Qty: 1 EACH | Refills: 3 | Status: SHIPPED | OUTPATIENT
Start: 2023-03-15

## 2023-03-15 RX ORDER — CALCIUM CITRATE/VITAMIN D3 200MG-6.25
TABLET ORAL
Qty: 150 EACH | Refills: 12 | Status: SHIPPED | OUTPATIENT
Start: 2023-03-15

## 2023-10-04 DIAGNOSIS — E16.2 HYPOGLYCEMIA: ICD-10-CM

## 2023-10-04 DIAGNOSIS — E10.9 TYPE 1 DIABETES MELLITUS WITHOUT COMPLICATION: ICD-10-CM

## 2023-10-04 RX ORDER — PROCHLORPERAZINE 25 MG/1
1 SUPPOSITORY RECTAL
Qty: 1 EACH | Refills: 3 | Status: SHIPPED | OUTPATIENT
Start: 2023-10-04

## 2023-10-04 RX ORDER — GLUCAGON HYDROCHLORIDE 1 MG
1 KIT INJECTION AS NEEDED
Qty: 1 EACH | Refills: 11 | Status: SHIPPED | OUTPATIENT
Start: 2023-10-04

## 2023-10-04 RX ORDER — PROCHLORPERAZINE 25 MG/1
SUPPOSITORY RECTAL
Qty: 3 EACH | Refills: 11 | Status: SHIPPED | OUTPATIENT
Start: 2023-10-04

## 2024-01-02 DIAGNOSIS — E10.65 UNCONTROLLED TYPE 1 DIABETES MELLITUS WITH HYPERGLYCEMIA: ICD-10-CM

## 2024-01-02 DIAGNOSIS — E10.9 TYPE 1 DIABETES MELLITUS WITHOUT COMPLICATION: ICD-10-CM

## 2024-01-02 RX ORDER — CALCIUM CITRATE/VITAMIN D3 200MG-6.25
TABLET ORAL
Qty: 150 EACH | Refills: 12 | Status: SHIPPED | OUTPATIENT
Start: 2024-01-02

## 2024-01-02 RX ORDER — PROCHLORPERAZINE 25 MG/1
1 SUPPOSITORY RECTAL
Qty: 1 EACH | Refills: 3 | Status: SHIPPED | OUTPATIENT
Start: 2024-01-02

## 2024-01-02 RX ORDER — PROCHLORPERAZINE 25 MG/1
SUPPOSITORY RECTAL
Qty: 3 EACH | Refills: 11 | Status: SHIPPED | OUTPATIENT
Start: 2024-01-02

## 2024-01-02 NOTE — TELEPHONE ENCOUNTER
Rx Refill Note  Requested Prescriptions     Pending Prescriptions Disp Refills    True Metrix Blood Glucose Test test strip 150 each 12     Sig: Test 4 times daily    Insulin Lispro 100 UNIT/ML solution cartridge 90 mL 3     Sig: USE UP TO 90 UNITS DAILY VIA INSULIN PUMP.    Continuous Blood Gluc Sensor (Dexcom G6 Sensor) 3 each 11     Sig: Use Every 10 (Ten) Days.    Continuous Blood Gluc Transmit (Dexcom G6 Transmitter) misc 1 each 3     Sig: Use 1 each Every 3 (Three) Months.    Dx Code E10.65  Last office visit with prescribing clinician: 1/11/2023   Last telemedicine visit with prescribing clinician: Visit date not found   Next office visit with prescribing clinician: 1/23/2024                         Would you like a call back once the refill request has been completed: [] Yes [] No    If the office needs to give you a call back, can they leave a voicemail: [] Yes [] No    Gabriela Baker MA  01/02/24, 11:39 EST

## 2024-01-08 DIAGNOSIS — E10.65 UNCONTROLLED TYPE 1 DIABETES MELLITUS WITH HYPERGLYCEMIA: ICD-10-CM

## 2024-01-08 DIAGNOSIS — E10.9 TYPE 1 DIABETES MELLITUS WITHOUT COMPLICATION: ICD-10-CM

## 2024-01-08 RX ORDER — PROCHLORPERAZINE 25 MG/1
1 SUPPOSITORY RECTAL
Qty: 1 EACH | Refills: 3 | OUTPATIENT
Start: 2024-01-08

## 2024-01-08 RX ORDER — CALCIUM CITRATE/VITAMIN D3 200MG-6.25
TABLET ORAL
Qty: 150 EACH | Refills: 12 | OUTPATIENT
Start: 2024-01-08

## 2024-01-09 ENCOUNTER — PRIOR AUTHORIZATION (OUTPATIENT)
Dept: ENDOCRINOLOGY | Facility: CLINIC | Age: 29
End: 2024-01-09
Payer: MEDICAID

## 2024-01-09 NOTE — TELEPHONE ENCOUNTER
PA submitted via [facility] for Dexcom Transmitter. Came back saying it was available w/o authorization.

## 2024-01-23 ENCOUNTER — OFFICE VISIT (OUTPATIENT)
Dept: ENDOCRINOLOGY | Facility: CLINIC | Age: 29
End: 2024-01-23
Payer: MEDICAID

## 2024-01-23 VITALS
HEIGHT: 66 IN | HEART RATE: 89 BPM | DIASTOLIC BLOOD PRESSURE: 88 MMHG | WEIGHT: 196.6 LBS | OXYGEN SATURATION: 99 % | SYSTOLIC BLOOD PRESSURE: 128 MMHG | BODY MASS INDEX: 31.6 KG/M2

## 2024-01-23 DIAGNOSIS — Z46.81 INSULIN PUMP TITRATION: Chronic | ICD-10-CM

## 2024-01-23 DIAGNOSIS — E10.649 CONTROLLED TYPE 1 DIABETES MELLITUS WITH HYPOGLYCEMIA: Primary | Chronic | ICD-10-CM

## 2024-01-23 LAB
ALBUMIN SERPL-MCNC: 4.3 G/DL (ref 3.5–5.2)
ALBUMIN UR-MCNC: <1.2 MG/DL
ALBUMIN/GLOB SERPL: 1.4 G/DL
ALP SERPL-CCNC: 52 U/L (ref 39–117)
ALT SERPL W P-5'-P-CCNC: 12 U/L (ref 1–33)
ANION GAP SERPL CALCULATED.3IONS-SCNC: 11.4 MMOL/L (ref 5–15)
AST SERPL-CCNC: 14 U/L (ref 1–32)
BILIRUB SERPL-MCNC: 0.3 MG/DL (ref 0–1.2)
BUN SERPL-MCNC: 9 MG/DL (ref 6–20)
BUN/CREAT SERPL: 13.2 (ref 7–25)
CALCIUM SPEC-SCNC: 10 MG/DL (ref 8.6–10.5)
CHLORIDE SERPL-SCNC: 102 MMOL/L (ref 98–107)
CHOLEST SERPL-MCNC: 191 MG/DL (ref 0–200)
CO2 SERPL-SCNC: 24.6 MMOL/L (ref 22–29)
CREAT SERPL-MCNC: 0.68 MG/DL (ref 0.57–1)
CREAT UR-MCNC: 22.2 MG/DL
EGFRCR SERPLBLD CKD-EPI 2021: 122.6 ML/MIN/1.73
EXPIRATION DATE: ABNORMAL
GLOBULIN UR ELPH-MCNC: 3.1 GM/DL
GLUCOSE BLDC GLUCOMTR-MCNC: 152 MG/DL (ref 70–130)
GLUCOSE SERPL-MCNC: 118 MG/DL (ref 65–99)
HBA1C MFR BLD: 6 % (ref 4.5–5.7)
HDLC SERPL-MCNC: 41 MG/DL (ref 40–60)
LDLC SERPL CALC-MCNC: 137 MG/DL (ref 0–100)
LDLC/HDLC SERPL: 3.31 {RATIO}
Lab: ABNORMAL
MICROALBUMIN/CREAT UR: NORMAL MG/G{CREAT}
POTASSIUM SERPL-SCNC: 3.9 MMOL/L (ref 3.5–5.2)
PROT SERPL-MCNC: 7.4 G/DL (ref 6–8.5)
SODIUM SERPL-SCNC: 138 MMOL/L (ref 136–145)
TRIGL SERPL-MCNC: 72 MG/DL (ref 0–150)
TSH SERPL DL<=0.05 MIU/L-ACNC: 0.56 UIU/ML (ref 0.27–4.2)
VLDLC SERPL-MCNC: 13 MG/DL (ref 5–40)

## 2024-01-23 PROCEDURE — 83036 HEMOGLOBIN GLYCOSYLATED A1C: CPT | Performed by: PHYSICIAN ASSISTANT

## 2024-01-23 PROCEDURE — 80061 LIPID PANEL: CPT | Performed by: PHYSICIAN ASSISTANT

## 2024-01-23 PROCEDURE — 84443 ASSAY THYROID STIM HORMONE: CPT | Performed by: PHYSICIAN ASSISTANT

## 2024-01-23 PROCEDURE — 1159F MED LIST DOCD IN RCRD: CPT | Performed by: PHYSICIAN ASSISTANT

## 2024-01-23 PROCEDURE — 99214 OFFICE O/P EST MOD 30 MIN: CPT | Performed by: PHYSICIAN ASSISTANT

## 2024-01-23 PROCEDURE — 1160F RVW MEDS BY RX/DR IN RCRD: CPT | Performed by: PHYSICIAN ASSISTANT

## 2024-01-23 PROCEDURE — 82043 UR ALBUMIN QUANTITATIVE: CPT | Performed by: PHYSICIAN ASSISTANT

## 2024-01-23 PROCEDURE — 82570 ASSAY OF URINE CREATININE: CPT | Performed by: PHYSICIAN ASSISTANT

## 2024-01-23 PROCEDURE — 80053 COMPREHEN METABOLIC PANEL: CPT | Performed by: PHYSICIAN ASSISTANT

## 2024-01-23 PROCEDURE — 95251 CONT GLUC MNTR ANALYSIS I&R: CPT | Performed by: PHYSICIAN ASSISTANT

## 2024-01-23 PROCEDURE — 3044F HG A1C LEVEL LT 7.0%: CPT | Performed by: PHYSICIAN ASSISTANT

## 2024-01-23 RX ORDER — IBUPROFEN 600 MG/1
1 TABLET ORAL
COMMUNITY
Start: 2023-10-04

## 2024-01-23 RX ORDER — IBUPROFEN 800 MG/1
800 TABLET ORAL AS NEEDED
COMMUNITY
Start: 2024-01-16

## 2024-01-23 NOTE — PROGRESS NOTES
Chief Complaint  F/u for Diabetes Mellitus.    HPI   Suzanne Rowan is a 28 y.o. female who is here today for F/u of Diabetes Mellitus type 1. The initial diagnosis of diabetes was made 1/2018.    Last appt 1/2023.  Utilizing pump more.     Diabetic complications: peripheral neuropathy, numbness top of both feet, R>L, better since starting gabapentin    Current diabetic medications include:  Humalog via Tandem insulin pump    ACEI/ARB: no    Statin: no    Past medications: gabapentin 100mg qd (didn't notice a difference off the medication)    Diabetic Monitoring  -   Tandem pump/Dexcom downloaded and reviewed: Time in range 92%, basal 88%, bolus 12%, some night time hypoglycemia and after control iq bolus, rare postprandial hyperglycemia    The following portions of the patient's history were reviewed and updated by me as appropriate: allergies, current medications, past family history, past social history, past surgical history and problem list.    Past Medical History:   Diagnosis Date    Acid reflux     Diabetes mellitus type I     Gestational diabetes 2014    Not sure the exact date    Migraine        Medications    Current Outpatient Medications:     Blood Glucose Monitoring Suppl (TRUE METRIX METER) w/Device kit, TEST THREE TIMES DAILY, Disp: , Rfl: 0    Continuous Blood Gluc  (Dexcom G6 ) device, 1 each Continuous., Disp: 1 each, Rfl: 0    Continuous Blood Gluc Sensor (Dexcom G6 Sensor), Use Every 10 (Ten) Days., Disp: 3 each, Rfl: 11    Continuous Blood Gluc Transmit (Dexcom G6 Transmitter) misc, Use 1 each Every 3 (Three) Months., Disp: 1 each, Rfl: 3    GlucaGen HypoKit 1 MG injection, Inject 1 mg under the skin into the appropriate area as directed As Needed for Low Blood Sugar., Disp: 1 each, Rfl: 11    glucagon (GLUCAGEN) 1 MG injection, Inject 1 mg into the appropriate muscle as directed by prescriber., Disp: , Rfl:     ibuprofen (ADVIL,MOTRIN) 800 MG tablet, Take 1 tablet by mouth As  "Needed., Disp: , Rfl:     Insulin Infusion Pump (T: slim X2 Ins /Control 7.4) device, Use., Disp: , Rfl:     Insulin Lispro 100 UNIT/ML solution cartridge, USE UP TO 90 UNITS DAILY VIA INSULIN PUMP., Disp: 90 mL, Rfl: 3    True Metrix Blood Glucose Test test strip, Test 4 times daily, Disp: 150 each, Rfl: 12    TRUEplus Safety Lancets 28G misc, Use 4x/day for testing, Disp: 150 each, Rfl: 12    Review of Systems  Review of Systems   All other systems reviewed and are negative.       Physical Exam    /88 (BP Location: Left arm, Patient Position: Sitting, Cuff Size: Adult)   Pulse 89   Ht 167.6 cm (66\")   Wt 89.2 kg (196 lb 9.6 oz)   SpO2 99%   BMI 31.73 kg/m² Body mass index is 31.73 kg/m².  Physical Exam   Constitutional: She is oriented to person, place, and time. She appears well-developed. No distress.   AAOx3   Eyes: Lids are normal. Right pupil is reactive. Left pupil is reactive.   Neck: No JVD present. No tracheal deviation present. No thyroid mass and no thyromegaly present.   Cardiovascular: Normal rate, regular rhythm and normal heart sounds.   No murmur heard.  Pulmonary/Chest: Effort normal and breath sounds normal. No respiratory distress. She has no wheezes.   Musculoskeletal: No tenderness.    Suzanne had a diabetic foot exam performed today.   During the foot exam she had a monofilament test performed.    Neurological Sensory Findings - Unaltered hot/cold right ankle/foot discrimination and unaltered hot/cold left ankle/foot discrimination. Unaltered sharp/dull right ankle/foot discrimination and unaltered sharp/dull left ankle/foot discrimination. No right ankle/foot altered proprioception and no left ankle/foot altered proprioception  Vascular Status -  Her right foot exhibits normal foot vasculature  and no edema. Her left foot exhibits normal foot vasculature  and no edema.  Skin Integrity  -  Her right foot skin is intact.  She has no right foot onychomycosis, no right foot ulcer " and non-callous right foot.Her left foot skin is intact. She has no left foot onychomycosis, no left foot ulcer and non-callous left foot..  Lymphadenopathy:     She has no cervical adenopathy.   Neurological: She is alert and oriented to person, place, and time.   Skin: Skin is warm and dry. No rash noted. She is not diaphoretic. No erythema. No pallor.   Psychiatric: Her speech is normal and behavior is normal. Judgment and thought content normal.         Labs and Imaging   Lab Results   Component Value Date    HGBA1C 6.0 (A) 01/23/2024    HGBA1C 7.8 01/11/2023    HGBA1C 7.2 01/11/2022     Office Visit on 01/23/2024   Component Date Value Ref Range Status    Hemoglobin A1C 01/23/2024 6.0 (A)  4.5 - 5.7 % Final    Lot Number 01/23/2024 [id number]   Final    Expiration Date 01/23/2024 7/30/25   Final    Glucose 01/23/2024 152 (A)  70 - 130 mg/dL Final         Assessment / Plan   Diagnoses and all orders for this visit:    1. Controlled type 1 diabetes mellitus with hypoglycemia (Primary)  -     POC Glycosylated Hemoglobin (Hb A1C)  -     POC Glucose, Blood  -     Microalbumin / Creatinine Urine Ratio - Urine, Clean Catch  -     Comprehensive Metabolic Panel  -     Lipid Panel  -     TSH    2. Insulin pump titration        Diabetes Mellitus 1 under improved control  -A1C 7.8 though BG readings are higher than A1c suggests  -92% time in range  -night time and post control iq bolus hypoglycemia - decrease basal rate, weaken correction  -foot exam and labs updated today  -eye exam due and pt aware    There are no Patient Instructions on file for this visit.    Follow up: Return in about 4 months (around 5/23/2024).    Anais Cee PA-C        .

## 2024-05-24 ENCOUNTER — PRIOR AUTHORIZATION (OUTPATIENT)
Dept: ENDOCRINOLOGY | Facility: CLINIC | Age: 29
End: 2024-05-24
Payer: MEDICAID

## 2024-05-24 ENCOUNTER — TELEPHONE (OUTPATIENT)
Dept: ENDOCRINOLOGY | Facility: CLINIC | Age: 29
End: 2024-05-24

## 2024-05-24 NOTE — TELEPHONE ENCOUNTER
"  Caller: Suzanne Rowan \"Roxana Rowan\"    Relationship to patient: Self    Best call back number:[phone]    Patient is needing: PT IS NEEDING REFILL OF SENSORS. PT STATED THAT PHARMACY IS ASKING FOR A ALBERT AUTH BEFORE PT CAN GET REFILL. PT IS OUT OF SENSORS. PLEASE ADVISE.       Continuous Blood Gluc Sensor (Dexcom G6 Sensor)   "

## 2024-07-22 ENCOUNTER — HOSPITAL ENCOUNTER (EMERGENCY)
Facility: HOSPITAL | Age: 29
Discharge: OTHER INSTITUTION | DRG: 881 | End: 2024-07-23
Attending: STUDENT IN AN ORGANIZED HEALTH CARE EDUCATION/TRAINING PROGRAM
Payer: MEDICAID

## 2024-07-22 DIAGNOSIS — R45.851 SUICIDAL IDEATION: ICD-10-CM

## 2024-07-22 DIAGNOSIS — Z00.8 MEDICAL CLEARANCE FOR PSYCHIATRIC ADMISSION: Primary | ICD-10-CM

## 2024-07-22 LAB
ALBUMIN SERPL-MCNC: 4.7 G/DL (ref 3.5–5.2)
ALBUMIN/GLOB SERPL: 1.3 G/DL
ALP SERPL-CCNC: 72 U/L (ref 39–117)
ALT SERPL W P-5'-P-CCNC: 14 U/L (ref 1–33)
AMPHET+METHAMPHET UR QL: NEGATIVE
AMPHETAMINES UR QL: NEGATIVE
ANION GAP SERPL CALCULATED.3IONS-SCNC: 18.2 MMOL/L (ref 5–15)
APAP SERPL-MCNC: <5 MCG/ML (ref 0–30)
AST SERPL-CCNC: 14 U/L (ref 1–32)
BARBITURATES UR QL SCN: NEGATIVE
BASOPHILS # BLD AUTO: 0.07 10*3/MM3 (ref 0–0.2)
BASOPHILS NFR BLD AUTO: 1 % (ref 0–1.5)
BENZODIAZ UR QL SCN: NEGATIVE
BILIRUB SERPL-MCNC: 0.3 MG/DL (ref 0–1.2)
BUN SERPL-MCNC: 6 MG/DL (ref 6–20)
BUN/CREAT SERPL: 8.3 (ref 7–25)
BUPRENORPHINE SERPL-MCNC: NEGATIVE NG/ML
CALCIUM SPEC-SCNC: 9.4 MG/DL (ref 8.6–10.5)
CANNABINOIDS SERPL QL: NEGATIVE
CHLORIDE SERPL-SCNC: 104 MMOL/L (ref 98–107)
CO2 SERPL-SCNC: 19.8 MMOL/L (ref 22–29)
COCAINE UR QL: NEGATIVE
CREAT SERPL-MCNC: 0.72 MG/DL (ref 0.57–1)
DEPRECATED RDW RBC AUTO: 42.3 FL (ref 37–54)
EGFRCR SERPLBLD CKD-EPI 2021: 117 ML/MIN/1.73
EOSINOPHIL # BLD AUTO: 0.26 10*3/MM3 (ref 0–0.4)
EOSINOPHIL NFR BLD AUTO: 3.6 % (ref 0.3–6.2)
ERYTHROCYTE [DISTWIDTH] IN BLOOD BY AUTOMATED COUNT: 14.6 % (ref 12.3–15.4)
ETHANOL BLD-MCNC: 254 MG/DL (ref 0–10)
ETHANOL UR QL: 0.25 %
FENTANYL UR-MCNC: NEGATIVE NG/ML
GLOBULIN UR ELPH-MCNC: 3.7 GM/DL
GLUCOSE BLDC GLUCOMTR-MCNC: 106 MG/DL (ref 70–130)
GLUCOSE BLDC GLUCOMTR-MCNC: 125 MG/DL (ref 70–130)
GLUCOSE SERPL-MCNC: 123 MG/DL (ref 65–99)
HCT VFR BLD AUTO: 45.2 % (ref 34–46.6)
HGB BLD-MCNC: 15.4 G/DL (ref 12–15.9)
HOLD SPECIMEN: NORMAL
HOLD SPECIMEN: NORMAL
IMM GRANULOCYTES # BLD AUTO: 0.02 10*3/MM3 (ref 0–0.05)
IMM GRANULOCYTES NFR BLD AUTO: 0.3 % (ref 0–0.5)
LYMPHOCYTES # BLD AUTO: 2.61 10*3/MM3 (ref 0.7–3.1)
LYMPHOCYTES NFR BLD AUTO: 36.6 % (ref 19.6–45.3)
MCH RBC QN AUTO: 27.6 PG (ref 26.6–33)
MCHC RBC AUTO-ENTMCNC: 34.1 G/DL (ref 31.5–35.7)
MCV RBC AUTO: 81 FL (ref 79–97)
METHADONE UR QL SCN: NEGATIVE
MONOCYTES # BLD AUTO: 0.42 10*3/MM3 (ref 0.1–0.9)
MONOCYTES NFR BLD AUTO: 5.9 % (ref 5–12)
NEUTROPHILS NFR BLD AUTO: 3.75 10*3/MM3 (ref 1.7–7)
NEUTROPHILS NFR BLD AUTO: 52.6 % (ref 42.7–76)
NRBC BLD AUTO-RTO: 0 /100 WBC (ref 0–0.2)
OPIATES UR QL: NEGATIVE
OXYCODONE UR QL SCN: NEGATIVE
PCP UR QL SCN: NEGATIVE
PLATELET # BLD AUTO: 303 10*3/MM3 (ref 140–450)
PMV BLD AUTO: 9.2 FL (ref 6–12)
POTASSIUM SERPL-SCNC: 3.5 MMOL/L (ref 3.5–5.2)
PROT SERPL-MCNC: 8.4 G/DL (ref 6–8.5)
RBC # BLD AUTO: 5.58 10*6/MM3 (ref 3.77–5.28)
SALICYLATES SERPL-MCNC: <0.3 MG/DL
SODIUM SERPL-SCNC: 142 MMOL/L (ref 136–145)
TRICYCLICS UR QL SCN: NEGATIVE
TSH SERPL DL<=0.05 MIU/L-ACNC: 0.97 UIU/ML (ref 0.27–4.2)
WBC NRBC COR # BLD AUTO: 7.13 10*3/MM3 (ref 3.4–10.8)
WHOLE BLOOD HOLD COAG: NORMAL
WHOLE BLOOD HOLD SPECIMEN: NORMAL

## 2024-07-22 PROCEDURE — 36415 COLL VENOUS BLD VENIPUNCTURE: CPT

## 2024-07-22 PROCEDURE — 81025 URINE PREGNANCY TEST: CPT | Performed by: STUDENT IN AN ORGANIZED HEALTH CARE EDUCATION/TRAINING PROGRAM

## 2024-07-22 PROCEDURE — 80143 DRUG ASSAY ACETAMINOPHEN: CPT | Performed by: NURSE PRACTITIONER

## 2024-07-22 PROCEDURE — 80179 DRUG ASSAY SALICYLATE: CPT | Performed by: NURSE PRACTITIONER

## 2024-07-22 PROCEDURE — 80050 GENERAL HEALTH PANEL: CPT | Performed by: NURSE PRACTITIONER

## 2024-07-22 PROCEDURE — 93005 ELECTROCARDIOGRAM TRACING: CPT | Performed by: NURSE PRACTITIONER

## 2024-07-22 PROCEDURE — 81001 URINALYSIS AUTO W/SCOPE: CPT | Performed by: STUDENT IN AN ORGANIZED HEALTH CARE EDUCATION/TRAINING PROGRAM

## 2024-07-22 PROCEDURE — 82948 REAGENT STRIP/BLOOD GLUCOSE: CPT

## 2024-07-22 PROCEDURE — 82948 REAGENT STRIP/BLOOD GLUCOSE: CPT | Performed by: NURSE PRACTITIONER

## 2024-07-22 PROCEDURE — 99285 EMERGENCY DEPT VISIT HI MDM: CPT

## 2024-07-22 PROCEDURE — 80307 DRUG TEST PRSMV CHEM ANLYZR: CPT | Performed by: NURSE PRACTITIONER

## 2024-07-22 PROCEDURE — 82077 ASSAY SPEC XCP UR&BREATH IA: CPT | Performed by: NURSE PRACTITIONER

## 2024-07-22 RX ORDER — SODIUM CHLORIDE 0.9 % (FLUSH) 0.9 %
10 SYRINGE (ML) INJECTION AS NEEDED
Status: DISCONTINUED | OUTPATIENT
Start: 2024-07-22 | End: 2024-07-23 | Stop reason: HOSPADM

## 2024-07-22 NOTE — ED PROVIDER NOTES
Pt Name: Suzanne Rowan  MRN: [id number]  : 1995  Date of Encounter: 2024    PCP: Provider, No Known      Subjective    History of Present Illness:    Chief Complaint: Medical clearance    History of Present Illness: Suzanne Rowan is a 29 y.o. female who presents to the ER complaining of laceration to left forearm that started 2 days ago.  Patient was brought in to be evaluated for medical clearance to go to [facility] on a psychiatric MDO.  Patient states she intentionally cut her forearm 2 days ago is stop bleeding.        Triage Vitals:    ED Triage Vitals   Temp Pulse Resp BP SpO2   -- -- -- -- --      Temp src Heart Rate Source Patient Position BP Location FiO2 (%)   -- -- -- -- --       Nurses Notes reviewed and agree, including vitals, allergies, social history and prior medical history.     Patient has no known allergies.    Past Medical History:   Diagnosis Date    Acid reflux     Diabetes mellitus type I     Gestational diabetes 2014    Not sure the exact date    Migraine        History reviewed. No pertinent surgical history.    Social History     Socioeconomic History    Marital status: Single   Tobacco Use    Smoking status: Every Day     Current packs/day: 1.00     Average packs/day: 1 pack/day for 4.9 years (4.9 ttl pk-yrs)     Types: Cigarettes, Electronic Cigarette     Start date: 2019   Vaping Use    Vaping status: Every Day    Devices: Disposable   Substance and Sexual Activity    Alcohol use: Not Currently    Drug use: No    Sexual activity: Yes     Partners: Female     Birth control/protection: None, Same-sex partner     Comment: Hes had a vasectomy       Family History   Problem Relation Age of Onset    Fibromyalgia Mother     Diabetes Sister     Diabetes Paternal Grandfather     Diabetes Father        REVIEW OF SYSTEMS:     All systems reviewed and not pertinent unless noted.    Review of Systems   Skin:  Positive for wound.       Objective    Physical  Exam  Vitals and nursing note reviewed.   Constitutional:       Appearance: Normal appearance.   HENT:      Head: Normocephalic and atraumatic.   Eyes:      Extraocular Movements: Extraocular movements intact.      Pupils: Pupils are equal, round, and reactive to light.   Cardiovascular:      Rate and Rhythm: Normal rate and regular rhythm.      Pulses: Normal pulses.      Heart sounds: Normal heart sounds.   Pulmonary:      Effort: Pulmonary effort is normal.      Breath sounds: Normal breath sounds.   Abdominal:      General: Abdomen is flat. Bowel sounds are normal.      Palpations: Abdomen is soft.   Musculoskeletal:      Cervical back: Normal range of motion and neck supple.   Skin:     Capillary Refill: Capillary refill takes less than 2 seconds.   Neurological:      General: No focal deficit present.      Mental Status: She is alert and oriented to person, place, and time. Mental status is at baseline.      GCS: GCS eye subscore is 4. GCS verbal subscore is 5. GCS motor subscore is 6.      Sensory: Sensation is intact.      Motor: Motor function is intact.      Gait: Gait is intact.   Psychiatric:         Attention and Perception: Attention and perception normal.         Mood and Affect: Mood and affect normal.         Speech: Speech normal.         Behavior: Behavior normal. Behavior is cooperative.                           Procedures    ED Course:    ECG 12 Lead Tachycardia   Final Result          ED Course as of 07/22/24 1956 Mon Jul 22, 2024   1854 After evaluation and discussion by behavioral health patient will have a 72-hour hold placed here for suicidal ideation.  [place] police have agreed to see if patient can be placed here for her suicidal ideation. [KH]   1936 I have discussed case with behavioral health will redraw alcohol level and 1:30 AM on 7- for evaluation.  Patient still on 72-hour hold for suicidal ideation. [KH]      ED Course User Index  [KH] Devang Piper, APRN        Orders placed during this visit:    Orders Placed This Encounter   Procedures    [place] Draw    Comprehensive Metabolic Panel    Acetaminophen Level    Ethanol    Salicylate Level    Urine Drug Screen - Urine, Clean Catch    TSH    CBC Auto Differential    Fentanyl, Urine - Urine, Clean Catch    Ethanol    Vital Signs    Continuous Pulse Oximetry    Psych / Access to See    Oxygen Therapy- Nasal Cannula; Titrate 1-6 LPM Per SpO2; 90 - 95%    POC Glucose Once    ECG 12 Lead Tachycardia    Insert Peripheral IV    Legal Status 72hr Hold    CBC & Differential    Green Top (Gel)    Lavender Top    Gold Top - SST    Light Blue Top       LAB Results:    Lab Results (last 24 hours)       Procedure Component Value Units Date/Time    CBC & Differential [[id number]]  (Abnormal) Collected: 07/22/24 1843    Specimen: Blood Updated: 07/22/24 1849    Narrative:      The following orders were created for panel order CBC & Differential.  Procedure                               Abnormality         Status                     ---------                               -----------         ------                     CBC Auto Differential[[id number]]        Abnormal            Final result                 Please view results for these tests on the individual orders.    Comprehensive Metabolic Panel [[id number]]  (Abnormal) Collected: 07/22/24 1843    Specimen: Blood Updated: 07/22/24 1907     Glucose 123 mg/dL      BUN 6 mg/dL      Creatinine 0.72 mg/dL      Sodium 142 mmol/L      Potassium 3.5 mmol/L      Chloride 104 mmol/L      CO2 19.8 mmol/L      Calcium 9.4 mg/dL      Total Protein 8.4 g/dL      Albumin 4.7 g/dL      ALT (SGPT) 14 U/L      AST (SGOT) 14 U/L      Alkaline Phosphatase 72 U/L      Total Bilirubin 0.3 mg/dL      Globulin 3.7 gm/dL      A/G Ratio 1.3 g/dL      BUN/Creatinine Ratio 8.3     Anion Gap 18.2 mmol/L      eGFR 117.0 mL/min/1.73     Narrative:      GFR Normal >60  Chronic Kidney Disease <60  Kidney Failure  <15      Acetaminophen Level [[id number]]  (Normal) Collected: 07/22/24 1843    Specimen: Blood Updated: 07/22/24 1907     Acetaminophen <5.0 mcg/mL     Narrative:      Toxic = Greater than 150 mcg/mL    Ethanol [[id number]]  (Abnormal) Collected: 07/22/24 1843    Specimen: Blood Updated: 07/22/24 1907     Ethanol 254 mg/dL      Ethanol % 0.254 %     Narrative:      This result is for medical use only and should not be used for forensic purposes.    Salicylate Level [[id number]]  (Normal) Collected: 07/22/24 1843    Specimen: Blood Updated: 07/22/24 1907     Salicylate <0.3 mg/dL     TSH [[id number]]  (Normal) Collected: 07/22/24 1843    Specimen: Blood Updated: 07/22/24 1914     TSH 0.972 uIU/mL     CBC Auto Differential [[id number]]  (Abnormal) Collected: 07/22/24 1843    Specimen: Blood Updated: 07/22/24 1849     WBC 7.13 10*3/mm3      RBC 5.58 10*6/mm3      Hemoglobin 15.4 g/dL      Hematocrit 45.2 %      MCV 81.0 fL      MCH 27.6 pg      MCHC 34.1 g/dL      RDW 14.6 %      RDW-SD 42.3 fl      MPV 9.2 fL      Platelets 303 10*3/mm3      Neutrophil % 52.6 %      Lymphocyte % 36.6 %      Monocyte % 5.9 %      Eosinophil % 3.6 %      Basophil % 1.0 %      Immature Grans % 0.3 %      Neutrophils, Absolute 3.75 10*3/mm3      Lymphocytes, Absolute 2.61 10*3/mm3      Monocytes, Absolute 0.42 10*3/mm3      Eosinophils, Absolute 0.26 10*3/mm3      Basophils, Absolute 0.07 10*3/mm3      Immature Grans, Absolute 0.02 10*3/mm3      nRBC 0.0 /100 WBC     POC Glucose Once [[id number]]  (Normal) Collected: 07/22/24 1901    Specimen: Blood Updated: 07/22/24 1905     Glucose 125 mg/dL      Comment: Serial Number: [id number]:  [id number]       Urine Drug Screen - Urine, Clean Catch [[id number]]  (Normal) Collected: 07/22/24 1902    Specimen: Urine, Clean Catch Updated: 07/22/24 1923     THC, Screen, Urine Negative     Phencyclidine (PCP), Urine Negative     Cocaine Screen, Urine Negative     Methamphetamine, Ur Negative     Opiate  Screen Negative     Amphetamine Screen, Urine Negative     Benzodiazepine Screen, Urine Negative     Tricyclic Antidepressants Screen Negative     Methadone Screen, Urine Negative     Barbiturates Screen, Urine Negative     Oxycodone Screen, Urine Negative     Buprenorphine, Screen, Urine Negative    Narrative:      Cutoff For Drugs Screened:    Amphetamines               500 ng/ml  Barbiturates               200 ng/ml  Benzodiazepines            150 ng/ml  Cocaine                    150 ng/ml  Methadone                  200 ng/ml  Opiates                    100 ng/ml  Phencyclidine               25 ng/ml  THC                         50 ng/ml  Methamphetamine            500 ng/ml  Tricyclic Antidepressants  300 ng/ml  Oxycodone                  100 ng/ml  Buprenorphine               10 ng/ml    The normal value for all drugs tested is negative. This report includes unconfirmed screening results, with the cutoff values listed, to be used for medical treatment purposes only.  Unconfirmed results must not be used for non-medical purposes such as employment or legal testing.  Clinical consideration should be applied to any drug of abuse test, particularly when unconfirmed results are used.      Fentanyl, Urine - Urine, Clean Catch [[id number]]  (Normal) Collected: 07/22/24 1902    Specimen: Urine, Clean Catch Updated: 07/22/24 1924     Fentanyl, Urine Negative    Narrative:      Negative Threshold:      Fentanyl 5 ng/mL     The normal value for the drug tested is negative. This report includes final unconfirmed screening results to be used for medical treatment purposes only. Unconfirmed results must not be used for non-medical purposes such as employment or legal testing. Clinical consideration should be applied to any drug of abuse test, particularly when unconfirmed results are used.                    If labs were ordered, I have independently reviewed the results and considered them in the diagnosis and treatment  plan for the patient    RADIOLOGY    No radiology results from the last 24 hrs     If I have ordered, I have independently reviewed the above noted radiographic studies.  Please see the radiologist dictation for the official interpretation    Medications given to patient in the ER    Medications   sodium chloride 0.9 % flush 10 mL (has no administration in time range)       AS OF 19:56 EDT VITALS:    BP - 116/81  HR - 99  TEMP - 98.3 °F (36.8 °C) (Oral)  O2 SATS - 97%         Shared Decision Making: After my consideration of the clinical presentation and laboratory/radiology studies obtained, I have discussed the findings with the patient/patient representative who is in agreement with the treatment plan and final disposition. Risks and benefits of discharge and/or observation admission were discussed.  Final disposition of the patient will be discharged to the custody of [facility] .  Patient is requested to follow-up with primary care provider and specialist in 1 week following final discharge.    Discharge Medications Prescribed:  No new home medications were prescribed during this ER visit    Medical Decision Making  Suzanne Rowan is a 29 y.o. female who presents to the ER complaining of laceration to left forearm that started 2 days ago.  Patient was brought in to be evaluated for medical clearance to go to [facility] on a psychiatric MDO.  Patient states she intentionally cut her forearm 2 days ago is stop bleeding    DDX: includes but is not limited to: Left forearm laceration, behavioral health issue, medical clearance    Problems Addressed:  Medical clearance for psychiatric admission: complicated acute illness or injury  Suicidal ideation: complicated acute illness or injury    Amount and/or Complexity of Data Reviewed  Labs: ordered.  ECG/medicine tests: ordered.  Discussion of management or test interpretation with external provider(s): Discussed assessment, treatment and plan  with ER attending    Risk  Prescription drug management.  Risk Details: I have discussed with patient the finding of the test preformed today. Patient has been diagnosed with medical clearance for psychiatric admission and will be discharged home.  Patient requested to follow-up with primary care provider within the next 7 days for reevaluation. Strict return precautions have been given and patient verbalizes understanding        Final diagnoses:   Medical clearance for psychiatric admission   Suicidal ideation       Please note that portions of this document were completed using voice recognition dictation software.       Devang Piper, RUHT  07/22/24 1815       Devang Piper, RUTH  07/22/24 1947       Devang Piper, RUTH  07/22/24 1957

## 2024-07-23 ENCOUNTER — HOSPITAL ENCOUNTER (INPATIENT)
Facility: HOSPITAL | Age: 29
LOS: 1 days | Discharge: HOME | DRG: 881 | End: 2024-07-23
Attending: STUDENT IN AN ORGANIZED HEALTH CARE EDUCATION/TRAINING PROGRAM | Admitting: STUDENT IN AN ORGANIZED HEALTH CARE EDUCATION/TRAINING PROGRAM
Payer: MEDICAID

## 2024-07-23 VITALS
WEIGHT: 161.8 LBS | DIASTOLIC BLOOD PRESSURE: 99 MMHG | TEMPERATURE: 98.1 F | SYSTOLIC BLOOD PRESSURE: 169 MMHG | HEIGHT: 66 IN | RESPIRATION RATE: 16 BRPM | BODY MASS INDEX: 26 KG/M2 | HEART RATE: 98 BPM | OXYGEN SATURATION: 98 %

## 2024-07-23 VITALS
TEMPERATURE: 98.3 F | DIASTOLIC BLOOD PRESSURE: 82 MMHG | HEIGHT: 66 IN | OXYGEN SATURATION: 98 % | HEART RATE: 87 BPM | RESPIRATION RATE: 18 BRPM | SYSTOLIC BLOOD PRESSURE: 144 MMHG | BODY MASS INDEX: 31.53 KG/M2 | WEIGHT: 196.21 LBS

## 2024-07-23 PROBLEM — F32.A DEPRESSION: Status: ACTIVE | Noted: 2024-07-23

## 2024-07-23 PROBLEM — R45.851 SUICIDAL IDEATIONS: Status: ACTIVE | Noted: 2024-07-23

## 2024-07-23 PROBLEM — F10.10 ALCOHOL ABUSE: Status: ACTIVE | Noted: 2024-07-23

## 2024-07-23 LAB
AMORPH URATE CRY URNS QL MICRO: ABNORMAL /HPF
B-HCG UR QL: NEGATIVE
BACTERIA UR QL AUTO: ABNORMAL /HPF
BILIRUB UR QL STRIP: NEGATIVE
CLARITY UR: ABNORMAL
COD CRY URNS QL: ABNORMAL /HPF
COLOR UR: ABNORMAL
ETHANOL BLD-MCNC: 85 MG/DL (ref 0–10)
ETHANOL UR QL: 0.09 %
GLUCOSE BLDC GLUCOMTR-MCNC: 113 MG/DL (ref 70–130)
GLUCOSE BLDC GLUCOMTR-MCNC: 124 MG/DL (ref 70–130)
GLUCOSE UR STRIP-MCNC: NEGATIVE MG/DL
HGB UR QL STRIP.AUTO: NEGATIVE
HYALINE CASTS UR QL AUTO: ABNORMAL /LPF
KETONES UR QL STRIP: ABNORMAL
LEUKOCYTE ESTERASE UR QL STRIP.AUTO: NEGATIVE
NITRITE UR QL STRIP: NEGATIVE
PH UR STRIP.AUTO: 5.5 [PH] (ref 5–8)
PROT UR QL STRIP: ABNORMAL
RBC # UR STRIP: ABNORMAL /HPF
REF LAB TEST METHOD: ABNORMAL
SP GR UR STRIP: 1.03 (ref 1–1.03)
SQUAMOUS #/AREA URNS HPF: ABNORMAL /HPF
UROBILINOGEN UR QL STRIP: ABNORMAL
WBC # UR STRIP: ABNORMAL /HPF

## 2024-07-23 PROCEDURE — 82077 ASSAY SPEC XCP UR&BREATH IA: CPT | Performed by: NURSE PRACTITIONER

## 2024-07-23 PROCEDURE — 99236 HOSP IP/OBS SAME DATE HI 85: CPT | Performed by: PSYCHIATRY & NEUROLOGY

## 2024-07-23 PROCEDURE — 82948 REAGENT STRIP/BLOOD GLUCOSE: CPT

## 2024-07-23 PROCEDURE — 82948 REAGENT STRIP/BLOOD GLUCOSE: CPT | Performed by: PSYCHIATRY & NEUROLOGY

## 2024-07-23 RX ORDER — ALUMINA, MAGNESIA, AND SIMETHICONE 2400; 2400; 240 MG/30ML; MG/30ML; MG/30ML
15 SUSPENSION ORAL EVERY 6 HOURS PRN
Status: DISCONTINUED | OUTPATIENT
Start: 2024-07-23 | End: 2024-07-23 | Stop reason: HOSPADM

## 2024-07-23 RX ORDER — NICOTINE POLACRILEX 4 MG
15 LOZENGE BUCCAL
Status: DISCONTINUED | OUTPATIENT
Start: 2024-07-23 | End: 2024-07-23 | Stop reason: HOSPADM

## 2024-07-23 RX ORDER — POLYETHYLENE GLYCOL 3350 17 G/17G
17 POWDER, FOR SOLUTION ORAL DAILY PRN
Status: DISCONTINUED | OUTPATIENT
Start: 2024-07-23 | End: 2024-07-23 | Stop reason: HOSPADM

## 2024-07-23 RX ORDER — INSULIN LISPRO 100 [IU]/ML
3-14 INJECTION, SOLUTION INTRAVENOUS; SUBCUTANEOUS
Status: DISCONTINUED | OUTPATIENT
Start: 2024-07-23 | End: 2024-07-23 | Stop reason: HOSPADM

## 2024-07-23 RX ORDER — NICOTINE 21 MG/24HR
1 PATCH, TRANSDERMAL 24 HOURS TRANSDERMAL EVERY 24 HOURS
Status: CANCELLED | OUTPATIENT
Start: 2024-07-23

## 2024-07-23 RX ORDER — ACETAMINOPHEN 325 MG/1
650 TABLET ORAL EVERY 4 HOURS PRN
Status: DISCONTINUED | OUTPATIENT
Start: 2024-07-23 | End: 2024-07-23 | Stop reason: HOSPADM

## 2024-07-23 RX ORDER — DEXTROSE MONOHYDRATE 25 G/50ML
25 INJECTION, SOLUTION INTRAVENOUS
Status: DISCONTINUED | OUTPATIENT
Start: 2024-07-23 | End: 2024-07-23 | Stop reason: HOSPADM

## 2024-07-23 RX ORDER — HYDROXYZINE HYDROCHLORIDE 25 MG/1
50 TABLET, FILM COATED ORAL EVERY 6 HOURS PRN
Status: DISCONTINUED | OUTPATIENT
Start: 2024-07-23 | End: 2024-07-23 | Stop reason: HOSPADM

## 2024-07-23 RX ORDER — TRAZODONE HYDROCHLORIDE 50 MG/1
50 TABLET ORAL NIGHTLY PRN
Status: DISCONTINUED | OUTPATIENT
Start: 2024-07-23 | End: 2024-07-23 | Stop reason: HOSPADM

## 2024-07-23 RX ORDER — LOPERAMIDE HYDROCHLORIDE 2 MG/1
2 CAPSULE ORAL
Status: DISCONTINUED | OUTPATIENT
Start: 2024-07-23 | End: 2024-07-23 | Stop reason: HOSPADM

## 2024-07-23 RX ORDER — NICOTINE 21 MG/24HR
1 PATCH, TRANSDERMAL 24 HOURS TRANSDERMAL EVERY 24 HOURS
Status: DISCONTINUED | OUTPATIENT
Start: 2024-07-23 | End: 2024-07-23 | Stop reason: HOSPADM

## 2024-07-23 RX ADMIN — NICOTINE 1 PATCH: 21 PATCH TRANSDERMAL at 09:06

## 2024-07-23 NOTE — THERAPY DISCHARGE NOTE
"Therapist met 1-1 in the office. Patient calm/cooperative, oriented x 4.  Patient noted to have appropriate affect, congruent mood, normal thought content. Patient denies SI/HI/AVH and acute symptoms.   Safety planning conducted; patient/family denies access to firearms, weapons, medications. Medications were recommended to be safe guarded and for family to administer with patient.     Assisted patient in identifying risk factors which would indicate the need for higher level of care including thoughts to harm self or others and/or self-harming behavior and encouraged patient to call [phone], call 911, or present to the nearest emergency room should any of these events occur. Discussed crisis intervention services and means to access.  Patient adamantly and convincingly denies current suicidal or homicidal ideation or perceptual disturbance.    Therapist completed the following safety plan with the patient       SAFETY/MENTAL HEALTH PLAN    1. Recognizing warning signs: Warning signs that a crisis may be developing such as thoughts, images, mood, situation, behaviors:    \"Getting overwhelmed, self-harming\"    2. Internal coping strategies: Things that the patient can do to take their mind off problems without contacting another person such as relaxation techniques, physical activity, etc:    \"Paint, listen to music, video games.\"     3. Socialization strategies for distraction and support: People and social settings that provide distraction or support - names or places, telephone:      Nae Fong Amanda    4. Social contact for assistance in resolving crisis: People the patient can ask for help - names and telephone:    Nae Fong Amanda     5. Professionals or agencies contacts to help resolve crisis: Professionals or agencies the patient can contact during a crisis - clinician name/location/phone/emergency contact number, local urgent care services with address/phone, National Suicide Prevention Lifeline (982), " Emergency contact 911:       [facility], 988, 911, crisis resources provided.     6. Means restriction: Secure sharps, medications, safeguard weapons, identified crisis plan, made multiple outpatient provider appointments for follow up care.

## 2024-07-23 NOTE — SIGNIFICANT NOTE
[facility] -  Discharge Note    SW confirmed appointments. Patient has appointments scheduled within 7 day window. Appointment times have been reviewed with PT. PT verbally acknowledges the set appointment times. The appointments scheduled are listed below and on pt discharge summary. Pt does not currently take any medication and was not prescribed medication during admission. No medication follow up was made.    7/28/24 at 12:30 pm- Therapy with Carla Guillen (Standing appointment)  [facility]  Telehealth    SW provided resources for the patient in the area for utility assistance and food bob in the area. PT will DC back to her home via transport by her partner.     DARLENE also discussed the availability of emergency behavioral health services 24/7 at [facility] through the Emergency Department.  Assisted the patient in identifying risk factors that would indicate the need for higher level of care, such as thoughts to harm self or others and/or self-harming behavior(s). Encouraged patient to call 911, crisis hotlines, or present to the nearest emergency department should symptoms worsen, or in any crisis or emergency. Patient agreeable and voiced understanding.        Electronically Signed By:    MARCIANO Wyatt  Date/Time: 7/23/24 14:38

## 2024-07-23 NOTE — SIGNIFICANT NOTE
07/23/24 1419   Pertinent Diagnosis/Presenting Problems   Presenting Problems/Reason for Referral Suicidal ideation   Previous Problems Impacting Recreation Patient reported no perceived barriers to leisure participation.   Lifestyle/Recreational History/Interest   Profession/Job Full time at [employer]   Current or Previous  Service none   Current Living Situation with family   Transportation Situation car, drives self   Current Educational Level 11th grade   Support Network Patient reported she as a good support system.   Recreational History and Interests   How Important is Recreation to You? high importance   Satisfied With Leisure Lifestyle? yes   Participate Regularly in Leisure Activity? yes   Are You a Social Person? no   Do You Prefer To Be Alone? yes   Do You Like New Experiences? yes   Current Hobbies/Interests arts/crafts;music;games   Art/Crafts painting   Games electronic games   Music listening to music   Past Hobbies/Interests arts/crafts   Art/Crafts (Past Leisure Activity) painting   Barriers To Leisure Activity   Barriers to Leisure Activity lack of time   Coping/Wellbeing   Describe Yourself Patient described herself as pretty cool.   Personal Strengths Resilience, motivation, personal insight, support, stable income, stable housing   Current State of Wellbeing increased stressors   How Do You [place] With Life Stressors? Patient reported that she listens to music when stressed.   Factors Impacting Current State of Wellbeing coping skills/strategies;increase in stressors   Therapeutic Recreation Participation   Recreation Therapy Participation arts/crafts;exercise/fitness;games;meditation;music;puzzles;reading;television/movies/videos;trivia;writing/journaling/blogging   Art/Crafts drawing;painting   Exercise/Fitness strengthening/weight training;group exercise activity   Games board games;card games;brain games/fitness   Music listening to music   Reading books   Objectives of Recreation  Participation motivation and activity level through successful participation;positive attitudes leading to a healthy leisure lifestyle   Orientation to Therapeutic Recreation patient;received explanation of recreation therapy programs;completed therapeutic recreation assessment   Recreation Therapy Summary of Participation Patient pleasant and able to complete assessment. Patient had no identified leisure goals for hospitalization as she us currently preparing for discharge.   Therapeutic Recreation Assessment/Plan   Patient/Family Goal (Therapeutic Recreation) Increase utilization of leisure activities for self-care.  Increase knowledge and utilization of leisure activities as coping skills  Decrease symptom burden through leisure participation  Improve mood and reduce anxiety   Recreation Therapy Goals/Objectives coping skills/strategies;functional leisure skills;health promotion;health maintenance;leisure awareness;leisure skills/knowledge;leisure participation;problem-solving;relaxation response   Recreation Plan structured leisure participation   Referrals to Community Recreation Programs List available on request

## 2024-07-23 NOTE — PLAN OF CARE
"  Problem: Adult Behavioral Health Plan of Care  Goal: Plan of Care Review  Outcome: Ongoing, Progressing  Flowsheets (Taken 7/23/2024 1404)  Plan of Care Reviewed With: patient  Patient Agreement with Plan of Care: agrees  Outcome Evaluation: New admit. Completed social history, integrated summary and safety planning.  Goal: Patient-Specific Goal (Individualization)  Outcome: Ongoing, Progressing  Flowsheets  Taken 7/23/2024 1404  Patient-Specific Goals (Include Timeframe): Patient will deny SI/HI prior to discharge. Patient will identify 1 healthy coping skill and consent to appropriate aftercare plan prior to discharge.  Individualized Care Needs: Therapist to offer 1-4 therapy sessions, aftercare, planning, safety planning, family education, group therapy, and brief CBT/MI interventions.  Taken 7/23/2024 1328  Patient Personal Strengths: (\"Great mom\")   resilient   self-reliant   other (see comments)   family/social support   independent living skills   interests/hobbies   parenting skills  Patient Vulnerabilities: (Finances) other (see comments)  Goal: Optimized Coping Skills in Response to Life Stressors  Outcome: Ongoing, Progressing  Intervention: Promote Effective Coping Strategies  Flowsheets (Taken 7/23/2024 1404)  Supportive Measures:   active listening utilized   counseling provided   decision-making supported   positive reinforcement provided   problem-solving facilitated   self-responsibility promoted   verbalization of feelings encouraged   self-reflection promoted   self-care encouraged  Goal: Develops/Participates in Therapeutic [place] to Support Successful Transition  Outcome: Ongoing, Progressing  Intervention: Foster Therapeutic [place]  Flowsheets (Taken 7/23/2024 1404)  Trust Relationship/Rapport:   care explained   reassurance provided   thoughts/feelings acknowledged   choices provided   emotional support provided   empathic listening provided   questions answered   questions " encouraged  Intervention: Mutually Develop Transition Plan  Flowsheets  Taken 7/23/2024 1404  Outpatient/Agency/Support Group Needs:   outpatient counseling   outpatient medication management  Transition Support: follow-up care discussed  Anticipated Discharge Disposition: home with family  Taken 7/23/2024 1403  Transportation Anticipated: family or friend will provide  Transportation Concerns: none  Current Discharge Risk: psychiatric illness  Concerns to be Addressed: mental health  Readmission Within the Last 30 Days: no previous admission in last 30 days  Patient/Family Anticipated Services at Transition: mental health services  Patient's Choice of Community Agency(s): [facility]  Patient/Family Anticipates Transition to: home   Goal Outcome Evaluation:  Plan of Care Reviewed With: patient  Patient Agreement with Plan of Care: agrees        Outcome Evaluation: New admit. Completed social history, integrated summary and safety planning.

## 2024-07-23 NOTE — PLAN OF CARE
Goal Outcome Evaluation:           Progress: improving  Outcome Evaluation: Patient has been calm during this shift. Patient ready discharge at this time.

## 2024-07-23 NOTE — NURSING NOTE
07/23/24      Patient:  Suzanne Rowan  YOB: 1995          To Whom It May Concern,    Suzanne Rowan was absent from work due to hospitalization on 07/23/24.      Sincerely,        This document has been electronically signed by An Giordano MD.  July 23, 2024 15:05 EDT

## 2024-07-23 NOTE — NURSING NOTE
07/23/24      Patient:  Suzanne Rowan  YOB: 1995          To Whom It May Concern,    Suzanne Rowan was absent from work due to hospitalization from 07/23/24 to 07/25/24.      Sincerely,        This document has been electronically signed by An Giordano MD.  July 23, 2024 14:28 EDT

## 2024-07-23 NOTE — THERAPY EVALUATION
DATA:      Therapist met individually with patient this date to introduce role and to discuss hospitalization expectations. Patient agreeable. Reviewed medical record and staffed case with treatment team this date. No major issues identified.       Clinical Maneuvering/Intervention:     Therapist assisted patient in processing above session content; acknowledged and normalized patient’s thoughts, feelings, and concerns.  Discussed the therapist/patient relationship and explain the parameters and limitations of relative confidentiality.  Also discussed the importance of active participation, and honesty to the treatment process.  Encouraged the patient to discuss/vent their feelings, frustrations, and fears concerning their ongoing medical issues and validated their feelings.     Allowed patient to freely discuss issues without interruption or judgment. Provided safe, confidential environment to facilitate the development of positive therapeutic relationship and encourage open, honest communication.      Concern was voiced regarding substance use and educated patient on risks associated with use. Patient was advised to abstain from use and educated on community resources that can help with sobriety and recovery.      Therapist addressed discharge safety planning this date. Assisted patient in identifying risk factors which would indicate the need for higher level of care after discharge;  including thoughts to harm self or others and/or self-harming behavior. Encouraged patient to call [phone],  [phone], or present to the nearest emergency room should any of these events occur. Discussed crisis intervention services and means to access.  Encouraged securing any objects of harm.       Therapist completed integrated summary, treatment plan, and initiated social history this date.  Therapist is strongly encouraging family involvement in treatment.       ASSESSMENT:      Patient is a 29 year old female. Patient admitted to  [facility] for self-harm 7/23/24. Patient reports that she was drinking alcohol. Patient reports having 7-8 shots of Fireball last night. Patient reports that she got upset, hanging out with her ex-girlfriend and she was trying to calm her down. Patient reports her rent is due this Friday and has two more bills so she got overwhelmed. Patient denies current SI/HI/AVH.     Goals for treatment: \"To not be financially stressed out.\"     Prior Hospitalizations/Dates/current treatment:  [facility]. Patient denies any current psychiatric treatment. Patient reports that when she was in middle school she went to the [facility] to \"check it out.\" Patient reports having an evaluation at [facility].     Childhood History: Patient reports that her childhood was decent. Patient reports that her parents  when she was 11-12, potentially younger and her mother began seeing other men.      Suicide Attempts: Patient denies.        Alcohol: Patient denies.    Substance use: Patient denies.    Legal: Denies.    Relationship/support: Ajit (girlfriend), Nae (sister), Chantal (sister), Macy (friend/boss).    Spiritual: Denies.     Education: 11th grade.    Employment: [employer]     Access to firearms: Denies.         PLAN:       Patient to discharge to home. Patient to follow up with outpatient therapy through [facility].     Adult Social History (all recorded)       Adult Social History       Row Name 07/23/24 5305       I.  Treatment History    What has motivated you to decide to get treatment now? Self-harm       II.  Community Resources    Which agencies have you been involved with? Out Patient Services  [facility]       III.  Home Plan Assessment    Housing status Rent       IV.  Psychosocial Systems    What made you stop going to school? Patient reports that she got pregnant and her mother pulled her out.    Source of Income salary/wages       V.  Family of Origin/ Family Attitudes    Describe how you and your family got " "along when you were growing up Patient reports that her childhood was decent. Patient reports that her parents  when she was 11-12, potentially younger and her mother began seeing other men.       VII.  Substance Use and Addictive Behaviors -  Please document drug/alcohol specific details in the History Activity    Do you think you have a problem with drugs, alcohol, gambling or other addictive behaviors? No       VII.  Christian and Spiritual Orientation    Major Change/Loss/Stressor/Fears medical condition, self    Techniques to [place] with Loss/Stress/Change diversional activities;counseling       X. Other Information    Patient Personal Strengths resilient;self-reliant;other (see comments);family/social support;independent living skills;interests/hobbies;parenting skills  \"Great mom\"    Patient Vulnerabilities other (see comments)  Finances       Determinants     What cultural/environmental/ethnic factors are identified as contributing to the presenting problems? Patient is a 29 year old female impacted by mental health and financal concerns    What are the factors that effect the patient's emotional level? Depression, Anxiety, Finances    What are the factors the effect your plan for family or living environment involvement? Patient plans to return home upon discharge.    Initial family or living environment contacts made and results Therapist to discuss safety and dispo planning with patient and family upon discharge.    Assessment of family attitudes To be assessed.       Integrated Summary    [facility] Patient is a 29 year old female. Patient admitted to [facility] for self-harm 7/23/24. Therapist met with patient to complete assessment. Patient reports that she was drinking alcohol. Patient reports having 7-8 shots of Fireball last night. Patient reports that she got upset, hanging out with her ex-girlfriend and she was trying to calm her down. Patient reports her rent is due this Friday and has " two more bills so she got overwhelmed. Patient denies current SI/HI/AVH. Patient to discharge home with her girlfriend.

## 2024-07-23 NOTE — H&P
INITIAL PSYCHIATRIC HISTORY & PHYSICAL    Patient Identification:  Name:  Suzanne Rowan  Age:  29 y.o.  Sex:  female  :  1995  MRN:  [id number]   Visit Number:  [id number]  Primary Care Physician:  Provider, No Known    This provider is located at her home address in [place]. on file with [facility]. This visit is being done on behalf of [facility] using a secure platform for a video visit in a secure and private environment. The Patient is located at [facility]-on a locked Behavioral Health unit. The patient's condition being diagnosed/treated is appropriate for telemedicine. The provider identified herself as well as her credentials. The patient, and/or patient's guardian, consent to being seen remotely, and when consent is given they understand that the consent allows for patient identifiable information to be sent to a third party as needed. They may refuse to be seen remotely at any time. The electronic data is encrypted and password protected, and the patient and/or guardian has been advised of the potential risks to privacy not withstanding such measures.      Admission Legal Status:  72 hour hold    CC/Focus of Exam: depression, alcohol use, self injury      HPI: Suzanne Rowan is a 29 y.o. female who was admitted to [facility] on 2024 after presenting to the emergency room with complaints of lacerations to her left forearm week last week.  Patient did present with a blood alcohol level of 254.  She was brought in by the police after they were called to her home.   Patient was placed on a 72-hour hold for her safety.       Patient medically cleared in ER prior to being accepted to [facility] for psychiatric care. EKG reviewed and signed off on by ER provider.  Admission labs found to be essentially unremarkable with the exception of the blood alcohol level.       On Psychiatric Evaluation interview today patient reports that she is not  suicidal she denies making suicidal threats.  She does endorse that she was having thoughts of cutting herself and that she most recently did that over the weekend.  Last night she planned to do this again however her girlfriend removed eyebrow razor from her possession.  Patient had been drinking.  She states she is not a everyday drinker.   She denies to me that she is having significant feelings of depression.  She denies having suicidal thoughts intents or plan.  She denies having made statements prior to admission.  She denies past suicide attempts.  She has done some therapy recently.    She does admit to feeling overwhelmed.  She does voice having some significant stressors including, being a single parent.  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  Reports significant other is currently unemployed which leads to additional stressors for her.      Available medical/psychiatric records reviewed and incorporated into the current document.     PAST PSYCHIATRIC HX:  Patient has never previously been admitted she has no history of suicide attempts in the past.   She does have a history of self-injurious behaviors by cutting.  She has been to therapy previously.  No history of being on psychiatric medications.  She is not currently interested in psychiatric medications.    SUBSTANCE USE HX:  She is not an everyday drinker, her blood alcohol level was 254.  She denies ever being admitted for detox.  She denies drug use    SOCIAL HX:  Patient is working full-time at a gas station.  She has a 10-year-old son who she has full-time custody of.    She is in a supportive relationship but feels a stressor of her significant other being unemployed currently.      Past Medical History:   Diagnosis Date    Acid reflux     Alcohol abuse     Bipolar disorder     Borderline personality disorder     Diabetes mellitus type I     Gestational diabetes 2014    Not sure the exact date    Migraine         History reviewed. No  pertinent surgical history.    Family History   Problem Relation Age of Onset    Fibromyalgia Mother     Diabetes Sister     Diabetes Paternal Grandfather     Diabetes Father          Medications Prior to Admission   Medication Sig Dispense Refill Last Dose    Blood Glucose Monitoring Suppl (TRUE METRIX METER) w/Device kit TEST THREE TIMES DAILY  0 Unknown    Continuous Blood Gluc  (Dexcom G6 ) device 1 each Continuous. 1 each 0 Unknown    Continuous Blood Gluc Sensor (Dexcom G6 Sensor) Use Every 10 (Ten) Days. 3 each 11 Unknown    Continuous Blood Gluc Transmit (Dexcom G6 Transmitter) misc Use 1 each Every 3 (Three) Months. 1 each 3 Unknown    GlucaGen HypoKit 1 MG injection Inject 1 mg under the skin into the appropriate area as directed As Needed for Low Blood Sugar. 1 each 11 Unknown    glucagon (GLUCAGEN) 1 MG injection Inject 1 mg into the appropriate muscle as directed by prescriber. (Patient not taking: Reported on 7/23/2024)   Not Taking    ibuprofen (ADVIL,MOTRIN) 800 MG tablet Take 1 tablet by mouth As Needed. (Patient not taking: Reported on 7/23/2024)   Not Taking    Insulin Infusion Pump (T: slim X2 Ins /Control 7.4) device Use.   Unknown    Insulin Lispro 100 UNIT/ML solution cartridge USE UP TO 90 UNITS DAILY VIA INSULIN PUMP. 90 mL 3 Unknown    True Metrix Blood Glucose Test test strip Test 4 times daily 150 each 12 Unknown    TRUEplus Safety Lancets 28G misc Use 4x/day for testing 150 each 12 Unknown         ALLERGIES:  Patient has no known allergies.    Temp:  [98.1 °F (36.7 °C)-100.9 °F (38.3 °C)] 98.1 °F (36.7 °C)  Heart Rate:  [] 98  Resp:  [16-18] 16  BP: (116-169)/(81-99) 169/99    REVIEW OF SYSTEMS:  Review of Systems   General ROS: negative for - fever or malaise  Respiratory ROS: no cough, shortness of breath noted  Cardiovascular ROS: no chest pain reported  Gastrointestinal ROS: no abdominal pain, nausea, vomiting or diarrhea  Neuro: negative for  seizures  Musculoskeletal: No difficulty moving extremities        PHYSICAL EXAM:  Physical Exam  HENT:      Head: Normocephalic.      Nose: Nose normal.   Eyes:      Extraocular Movements: Extraocular movements intact.   Pulmonary:      Effort: Pulmonary effort is normal.   Musculoskeletal:      Cervical back: Normal range of motion.         This physical exam has been performed remotely in the unit aided of audio/visual communication tools. Nursing staff present and assisted as needed to complete.       Cranial Nerves I-XII  CN I:    Sense of smell grossly intact  CN II:               Pupils are equal and round. No gross deficits in visual acuity.  CN III IV VI:     Extraocular movements are intact.  CN V:              Facial sensation and strength of muscles of mastication grossly intact.  CN VII:            Muscles of facial expression reveal no asymmetry. Intact.   CN VIII:           Hearing is intact. Whispered voice intact.   CN IX and X:  Palate elevates symmetrically. Intact  CN XI:             Shoulder shrug is intact.   CN XII:            Tongue is midline without evidence of atrophy or fasciculation.     MENTAL STATUS EXAM:    Appearance: Casually dressed, good hygiene, multiple tattoos on her neck and arms bilaterally.  Behavior: Cooperative, good eye contact  Psychomotor: No psychomotor agitation noted, No EPS, No motor tics noted  Speech: normal rate, rhythm and tone  Mood: Dysthymic  Affect: congruent and appropriate to topic at hand  Thought Content: no delusional material present  Thought process: goal directed and generally organized.  Suicidality: Denies  Homicidality: No HI  Perception: No AH/VH  Insight: Fair  Judgment: Fair      Imaging Results (Last 24 Hours)       ** No results found for the last 24 hours. **             ECG/EMG Results (most recent)       None             Lab Results   Component Value Date    GLUCOSE 123 (H) 07/22/2024    BUN 6 07/22/2024    CREATININE 0.72 07/22/2024     EGFRIFNONA 109 01/11/2022    BCR 8.3 07/22/2024    CO2 19.8 (L) 07/22/2024    CALCIUM 9.4 07/22/2024    ALBUMIN 4.7 07/22/2024    AST 14 07/22/2024    ALT 14 07/22/2024       Lab Results   Component Value Date    WBC 7.13 07/22/2024    HGB 15.4 07/22/2024    HCT 45.2 07/22/2024    MCV 81.0 07/22/2024     07/22/2024       Last Urine Toxicity  More data exists         Latest Ref Rng & Units 7/22/2024 5/11/2024   LAST URINE TOXICITY RESULTS   Amphetamine, Urine Qual Negative Negative  -   Barbiturates Screen, Urine Negative Negative  Negative       Benzodiazepine Screen, Urine Negative Negative  Negative       Buprenorphine, Screen, Urine Negative Negative  -   Cocaine Screen, Urine Negative Negative  Negative       Fentanyl, Urine Negative Negative  Negative       Methadone Screen , Urine Negative Negative  Negative       Methamphetamine, Ur Negative Negative  -      Details          This result is from an external source.               Brief Urine Lab Results  (Last result in the past 365 days)        Color   Clarity   Blood   Leuk Est   Nitrite   Protein   CREAT   Urine HCG        07/22/24 1902               Negative       07/22/24 1902 Dark Yellow   Turbid   Negative   Negative   Negative   >=300 mg/dL (3+)                       ASSESSMENT & PLAN:        Suicidal ideations      -Admit to [facility] for safety and stabilization   -Acclimate to unit and daily schedule   -Patient to attend group therapies as well as participate in individual therapy sessions throughout time on the unit.  -Continue precautions and safety checks  -Order comfort PRN medications.  -Education on risks of smoking tobacco products to be complete during stay, and nicotine replacement options made available.  -Physical examinations completed and admission labs reviewed  -A treatment plan will be developed and agreed upon by the patient and treatment team.  -Treatment team to discuss case regularly and start discharge planning  early on, to aid in safe transition to a lower level of care when most appropriate for patient. With estimated length of stay 5 to 7 days.  -Medication reconciliation to be completed by Pharmacist, Nurse and Psychiatrist. RUFINA to be reviewed if indicated and risk versus benefits as well as alternatives to medication regimens discussed with the patient. Will monitor for side effects during inpatient stay.     -Medications:  Patient is a type I diabetic and is typically on a insulin pump.  This was discontinued and removed prior to admission.     Other: The patient is currently on a 72-hour hold, she does not meet criteria as a current danger to herself or others to continue this hold.   Collateral information obtained by her treatment team, from patient's girlfriend who states no safety concerns.    We will plan to discharge patient today and will have her follow-up for therapy     Psychiatrist:  An Giordano MD  07/23/24  13:52 EDT

## 2024-07-23 NOTE — SIGNIFICANT NOTE
07/23/24 1215   Group Therapy Session   Group Attendance attended group session   Time Session Began 1130   Time Session Ended 1205   Total Time (minutes) 35   Group Type recreation   Group Topic Covered balanced lifestyle;self care activities   Group Session Detail Patient participated in group discussion and activity on the benefits of regular stretching.   Patient Participation/Contribution refused to participate   Patient Participation Detail Patient sat in room but declined to participate. Patient sat in the back of the room looking out of the window.

## 2024-07-23 NOTE — DISCHARGE SUMMARY
Inpatient Psychiatry Discharge Summary  Same admission day/discharge day  Date of Discharge:  7/23/2024    Discharge Diagnosis:Principal Problem:    Suicidal ideations  Active Problems:    Alcohol abuse    Depression        Hospital Course:    Patient was admitted for safety and stabilization on a 72-hour hold, following feeling overly emotional while drinking alcohol, police were called,  routine labs were checked.  Patient was assigned a master's level therapist who met with the patient who also agreed patient would benefit more from a lower level of care given her social stressors and need to work   Significant other gave collateral, that indicated there may be some depression however safety is not a concern at this time for the patient.  Patient was not interested in taking psychiatric medication that she did not feel that she needed at this time.        Consults:   Consults       No orders found from 6/24/2024 to 7/24/2024.            Labs:  Lab Results (all)       Procedure Component Value Units Date/Time    POC Glucose 4x Daily Before Meals & at Bedtime [[id number]]  (Normal) Collected: 07/23/24 1206    Specimen: Blood Updated: 07/23/24 1209     Glucose 113 mg/dL      Comment: Serial Number: [id number]:  [id number]       POC Glucose Once [[id number]]  (Normal) Collected: 07/23/24 0904    Specimen: Blood Updated: 07/23/24 0910     Glucose 124 mg/dL      Comment: Serial Number: [id number]:  [id number]       Urinalysis, Microscopic Only - Urine, Clean Catch [[id number]]  (Abnormal) Collected: 07/22/24 1902    Specimen: Urine, Clean Catch Updated: 07/23/24 0300     RBC, UA None Seen /HPF      WBC, UA 0-2 /HPF      Comment: Urine culture not indicated.        Bacteria, UA 3+ /HPF      Squamous Epithelial Cells, UA 7-12 /HPF      Hyaline Casts, UA 0-2 /LPF      Calcium Oxalate Crystals, UA Large/3+ /HPF      Amorphous Crystals, UA Large/3+ /HPF      Methodology Manual Light Microscopy    Pregnancy, Urine -  Urine, Clean Catch [[id number]]  (Normal) Collected: 07/22/24 1902    Specimen: Urine, Clean Catch Updated: 07/23/24 0256     HCG, Urine QL Negative    Urinalysis With Culture If Indicated - Urine, Clean Catch [[id number]]  (Abnormal) Collected: 07/22/24 1902    Specimen: Urine, Clean Catch Updated: 07/23/24 0255     Color, UA Dark Yellow     Appearance, UA Turbid     pH, UA 5.5     Specific Gravity, UA 1.028     Glucose, UA Negative     Ketones, UA Trace     Bilirubin, UA Negative     Blood, UA Negative     Protein, UA >=300 mg/dL (3+)     Leuk Esterase, UA Negative     Nitrite, UA Negative     Urobilinogen, UA 0.2 E.U./dL    Narrative:      In absence of clinical symptoms, the presence of pyuria, bacteria, and/or nitrites on the urinalysis result does not correlate with infection.            Imaging:  Imaging Results (All)       None              Condition on Discharge:  Stable    Vital Signs  Temp:  [98.1 °F (36.7 °C)-100.9 °F (38.3 °C)] 98.1 °F (36.7 °C)  Heart Rate:  [] 98  Resp:  [16-18] 16  BP: (116-169)/(81-99) 169/99    Discharge Disposition  Home or Self Care    Discharge Medications     Discharge Medications        Continue These Medications        Instructions Start Date   Dexcom G6  device   1 each, Does not apply, Continuous      Dexcom G6 Sensor   Does not apply, Every 10 Days      Dexcom G6 Transmitter misc   1 each, Does not apply, Every 3 Months      GlucaGen HypoKit 1 MG injection  Generic drug: glucagon   1 mg, Subcutaneous, As Needed      Insulin Lispro 100 UNIT/ML solution cartridge   USE UP TO 90 UNITS DAILY VIA INSULIN PUMP.      T: slim X2 Ins /Control 7.4 device   Does not apply      True Metrix Blood Glucose Test test strip  Generic drug: glucose blood   Test 4 times daily      True Metrix Meter w/Device kit   TEST THREE TIMES DAILY      TRUEplus Safety Lancets 28G misc   Use 4x/day for testing             Stop These Medications      ibuprofen 800 MG tablet  Commonly known  as: BERNIE FALL            ASK your doctor about these medications        Instructions Start Date   glucagon 1 MG injection  Commonly known as: GLUCAGEN   1 mg               Discharge Diet: Regular    Activity at Discharge: As tolerated    Follow-up Appointments:    Future Appointments   Date Time Provider Department Center   8/21/2024  3:00 PM Anais Cee PA-C [facility]           Time: I spent  <30 minutes on this discharge activity which included: face-to-face encounter with the patient, reviewing the data in the system, coordination of the care with the nursing staff as well as consultants, documentation, and entering orders.

## 2024-07-23 NOTE — CONSULTS
Suzanne Rowan  1995    Race/Ethnicity: White or   Martial Status: Single  Guardian Name/Contact/etc: Self  Pt Lives With:  Self and her son ( who is currently with her sister)   Occupation: Full time at [employer]  Appearance: Appropriately Dressed     Time Called for Assessment: 18:10  Assessment Start and End: 18:10-19:30      DATA:   Clinician received a call from [facility] staff for a behavioral health consult.  The patient is agreeable to speak with the behavioral health team.  Met with patient at bedside. Patient is under 1:1 security monitoring Patient has been placed on a 72 hour hold.   The attending treatment team is MECHELLE Givens PA-C, RN   Patient presents today with chief compliant of Patient EMD by [employer] due to self injurious behaviors.  Therapist spoke with Officer KEARA Frank 437. Off icer Estela reported that he was  called to a home due to patient's self harm.  Officer reported that patient was visibly  upset and crying , appeared stressed. Officer stated that patient stated that she has been overwhelmed for the past few days. Officer stated that patient told him she did nto want to take medication and she felt to stressed to go to her therapy appointment. The officer reported that patient's girlfriend stated that she had to take razor blades away from her after patient cut her left fore arm. The officer reported that patient's girl friend and patient were ridding in the car after and the patient had mor razor blades . The Officer reported that the patient's girlfriend took the razor blade and threw it out the window. Officer reported that the patient's girlfriend stated that patient has come to  ED but signed herself out before being seen.  The officer stated that based on the statements made to him and patient in a visible state of stress he took her to have her medically cleared for EMD . Officer stated that he felt patient was a danger to her self. Clinician  "completed assessment with patient and observations are documented as follows.    ASSESSMENT:    Clinician consulted with patient for mental status exam and assessment.  Clinical descriptors are documented as follows.  Clinician completed CSSRS with patient for suicide risk assessment.  The results of patient’s CSSRS documented as follows.    Presenting Problems: Patient reported that she has been really stressed and overwhelmed due to her financial situation. Patient stated that she is the only one that works to pay bills. Patient stated that on Saturday she was stressed out and was laying in bed and got mad and took a razor blade and cut her left arm. Therapist was able to see a visible laceration on left forearm. Patient stated that she cut deep enough that it scared her and she called her girlfriend and told her  what happened. Patient stated that she has had suicidal thoughts but does not want to die. Patient stated that she has a therapist with [facility] from being admitted prior to [facility] for \" meltdown\" back in May. Patient stated that today she \" spaced out\" and her sister and girlfriend got scared and called 911. Patient stated that she is not on any medication. Patient reported that she has been diagnosed with Bi-polar, BPD, Anxiety and Depression    Current Stressors: Financial hardship     Established Therapy, Medication Management or Other Mental Health Services: Patient denies having a psychiatrist at this time and is not on any medication. Patient stated that she has been to a therapist, Natalia Guillen, at [facility] but only a few times with next appointment on 07/28/2024 via zoom    Current Psychiatric Medications: None reported at this time      Mental Status Exam:  Behavior: Depressed and Withdrawn  Psychomotor Movement: Appropriate  Attention and Cooperation: Normal and Cooperative  Mood: depressed and Affect: Appropriate  Orientation: alert and oriented to person, " place, and time   Thought Process: linear, logical, and goal directed  Thought Content: normal  Delusions: None   Hallucinations: None and Not demonstrated today   Concentration: Normal  Suicidal Ideation: Patient denied having suicidal ideations at this time, but has had history of self injurious behaviors recently lacerating her left forearm. Patient was EMD by RPANGEL[facility] and brought to  for mental health evaluation and placed on a 72 hour hold.   Homicidal Ideation: Absent  Hopelessness: Mild  Speech: Normal  Eye Contact: Downcast  Insight: Poor  Judgement: Poor    Depression: 5-6   Anxiety: 5-6  Sleep: Fair   Appetite: Tolerating diet       Hx of Psychiatric or Detox Hospitalizations:  Yes, describe: [facility], [facility]  Most recent inpatient admission: [facility] 05/2024    Suicidal Ideation Assessment:    [place]IA-SUICIDE SEVERITY RATING SCALE  Psychiatric Inpatient Setting - Discharge Screener    Ask questions that are bold and underlined Discharge   Ask Questions 1 and 2 YES NO   Wish to be Dead:   Person endorses thoughts about a wish to be dead or not alive anymore, or wish to fall asleep and not wake up.  While you were here in the hospital, have you wished you were dead or wished you could go to sleep and not wake up?  X   Suicidal Thoughts:   General non-specific thoughts of wanting to end one's life/die by suicide, “I've thought about killing myself” without general thoughts of ways to kill oneself/associated methods, intent, or plan.   While you were here in the hospital, have you actually had thoughts about killing yourself?   X   If YES to 2, ask questions 3, 4, 5, and 6.  If NO to 2, go directly to question 6   3) Suicidal Thoughts with Method (without Specific Plan or Intent to Act):   Person endorses thoughts of suicide and has thought of a least one method during the assessment period. This is different than a specific plan with time, place or method details worked out. “I thought about  taking an overdose but I never made a specific plan as to when where or how I would actually do it….and I would never go through with it.”   Have you been thinking about how you might kill yourself?      4) Suicidal Intent (without Specific Plan):   Active suicidal thoughts of killing oneself and patient reports having some intent to act on such thoughts, as opposed to “I have the thoughts but I definitely will not do anything about them.”   Have you had these thoughts and had some intention of acting on them or do you have some intention of acting on them after you leave the hospital?      5) Suicide Intent with Specific Plan:   Thoughts of killing oneself with details of plan fully or partially worked out and person has some intent to carry it out.   Have you started to work out or worked out the details of how to kill yourself either for while you were here in the hospital or for after you leave the hospital? Do you intend to carry out this plan?        6) Suicide Behavior    While you were here in the hospital, have you done anything, started to do anything, or prepared to do anything to end your life?    Examples: Took pills, cut yourself, tried to hang yourself, took out pills but didn't swallow any because you changed your mind or someone took them from you, collected pills, secured a means of obtaining a gun, gave away valuables, wrote a will or suicide note, etc.  X     Suicidal: Patient denied having suicidal ideations at this time, but has had history of self injurious behaviors  with recently lacerating her left forearm. Patient was EMD by RPANGELO and brought to  for mental health evaluation and placed on a 72 hour hold.      Previous Attempts: patient denied having attempts, but does report history of cutting.Patient stated that she only cuts on her left arm. Therapist observed an open laceration ( not bleeding) on left forearm where patient took a razor blade and cut on Saturday.       Psychosocial  History    Highest Level of Education: Patient did not disclose   Family Hx of Mental Health/Substance Abuse: Yes, Patient reported father had alcohol use, Mom and sisters have been diagnosed with BPD and bi-polar  Patient Trauma/Abuse History: patient did not disclose    Does this require reporting: N/A  Patient Identified Support System (List family members, loved ones, guardians, friends, etc): Patient did not disclsoe    Legal History / History of Violence: Denies significant history of legal issues.   Experience with Interpersonal Violence: No  History of Inappropriate Sexual Behavior: No  Current Medical Conditions or Biomedical Complications: Yes (If yes, explain/list)  Patient has a dexcom and insulin pump for type 1 diabetes, patient reported she is on humalog, but could not stated dosage.    Social Determinants of Health  Housing Instability and/or Utility Needs: No  Food Insecurity: No  Transportation Needs: No    Substance Use History  Active Use: Yes, patient stated that she occasionally drinks Fireball, patient stated that she will have 10 shots when she drinks. Patient denies having history of withdrawals and no history of seizures. Patient' EtOH at arrival was 256mg/dl. Patient has not exhibited any withdrawal symptoms at this time. Patient stated that she had 7-8 fireball shots today.          Clinical [place] Withdrawal Assessment of Alcohol Scale (CIWA)  Pulse or heart rate, taken for one minute: 87  Blood pressure:144/82    NAUSEA AND VOMITING--Ask “Do you fell sick to your stomach? Have you vomited?” Observatoin.  0 no nausea and no vomiting  1 mild nausea with no vomiting  2  3  4 intermittent nausea with dry heaves  5  6  7 constant nausea, frequent dry heaves and vomiting    TREMOR--Arms extended and fingers spread apart.  Observation  0 no tremor  1 not visible, but can be felt fingertip to fingertip  2  3  4 moderate, with patient's arms extended  5  6  7 severe, even with arms not  extended    PAROXYSMAL SWEATS--Observation  0 no sweat visible  1 barely perceptible sweating, palms moist  2  3  4 beads of sweat obvious on forehead  5  6  7 drenching sweats    ANXIETY--Ask “Do you feel nervous?” Observation.  0 no anxiety, at ease  1 mild anxious  2  3  4 moderately anxious, or guarded, so anxiety is inferred  5  6  7 equivalent to acute panic states as seen in severe delirium or acute schizophrenic reactions    TACTILE DISTURBANCES--Ask “Have you any itching, pins and needles sensations, any burning, any numbness, or do you feel bugs crawling on or under your skin?” Observation.  0 none  1 very mild itching, pins and needles, burning or numbness  2 mild itching, pins and needles, burning or numbness  3 moderate itching, pins and needles, burning or numbness  4 moderately severe hallucinations  5 severe hallucinations  6 extremely severe hallucinations  7 continuous hallucinations  AUDITORY DISTURBANCES--Ask “Are you more aware of sounds around you ? Are they harsh? Do they frighten you?  Are you hearing anything that is disturbing to you?  Are you hearing things you know are not there? Observation.  0 not present  1 very mild harshness or ability to frighten  2 mild harshness or ability to frighten  3 moderate harshness or ability to frighten  4 moderately severe hallucinations  5 severe hallucinations  6 extremely severe hallucinations  7 continuous hallucinations       VISUAL DISTURBANCES--Ask “Does the light appear to be too bright? Is its color different? Does it hurt your eyes?  Are you seeing anything that is disturbing to you? Are you seeing things you know are not there?' Observation  0 not present  1 very mild sensitivity  2 mild sensitivity  3 moderate sensitivity  4 moderately severe hallucinations  5 severe hallucinations  6 extremely severe hallucinations  7 continuous hallucinations    HEADACHE, FULLNESS IN HEAD--Ask “Does your head feel different? Does it feel like there is a  band around your head?” Do not rate for dizziness or lightheadedness.  Otherwise, rate severity.  0 not present  1 very mild  2 mild  3 moderate  4 moderately severe  5 severe  6 very severe  7 extremely severe    AGITATION--Observation  0 normal activity  1 somewhat more than normal activity  2   3  4 moderately fidgety and restless  5  6  7 paces back and forth during most of the interview, or constantly thrashes about    ORIENTATION AND CLOUDING OF SENSORIUM--Ask   “What day is this? Where are you? Who am I?  0 oriented and can do serial additions  1 cannot do serial additions or is uncertain about date  2 disoriented for date by no more than 2 calendar days  3 disoriented for date by more than 2 calendar days  4 disoriented for place/or person    Total CIWA-Ar Score:0  Rater's Initials:CHAD      PLAN:  At this time, clinician recommends inpatient treatment based upon the above assessment and EMD by [facility]I patient was placed on a 72 hour hold due to self injurious behaviors.   Clinician collaborated with the treatment team who agree to adopt the recommendations.  Clinician discussed recommendations with patient and/or patient support systems, and patient is agreeable to the plan and being admitted to Behavioral health unit.     Have the levels of care been discussed with the patient? Yes  Level of care recommendation: Inpatient   Is patient agreeable to treatment? No, but understands that she has been placed on a 72 hour hold due to self injurious behaviors.       Care Coordination Timeline:  Patient will need a re draw for EtOH  as initial was 254mg/dl  orders have been placed for 0100 for redraw  0100  EtoH lab was completed awaiting results.   01:20 EtOH is 85mg/dl  01:34 Therapist presented to Dr. Benitez and Aron Treatment Team, Patient admitted involuntary on a 72 hour hold  01:40 Updated CDU Treatment team and patient. Facilitated RN to RN reporting.     SIGNATURE  Magdy Alves MA LPCA  07/23/2024

## 2024-07-23 NOTE — SIGNIFICANT NOTE
Behavioral Health  completed social determinants of health assessment with patient. SW evaluated patient's needs to determine best plan of action for discharge. SW will establish plan for discharge with patient and treatment team.     Pt identified that she has experienced some difficulty with utility assistance and food. PT states that she had just gotten EBT so that has been better. Sw provided resources for utility assistance in the area. No additional needs were identified.     SW clarified plan with patient and explored more plan options, and will assist patient in defining discharge needs     07/23/24 7711   Living Situation   Current Living Arrangements apartment   Potentially Unsafe Housing Conditions none   Food Insecurity   Within the past 12 months, you worried that your food would run out before you got the money to buy more. Sometimes   Within the past 12 months, the food you bought just didn't last and you didn't have money to get more. Sometimes   Transportation Needs   In the past 12 months, has lack of transportation kept you from medical appointments or from getting medications? no   In the past 12 months, has lack of transportation kept you from meetings, work, or from getting things needed for daily living? No   Utilities   In the past 12 months has the electric, gas, oil, or water company threatened to shut off services in your home? Yes   Abuse Screen (yes response referral indicated)   Feels Unsafe at Home or Work/School no   Feels Threatened by Someone no   Does Anyone Try to Keep You From Having Contact with Others or Doing Things Outside Your Home? no   Physical Signs of Abuse Present no   Financial Resource Strain   How hard is it for you to pay for the very basics like food, housing, medical care, and heating? Somewhat   Employment   Do you want help finding or keeping work or a job? I do not need or want help   Family and Community Support   If for any reason you need help  with day-to-day activities such as bathing, preparing meals, shopping, managing finances, etc., do you get the help you need? I don't need any help   How often do you feel lonely or isolated from those around you? Rarely   Education   Preferred Language English   Do you want help with school or training? For example, starting or completing job training or getting a high school diploma, GED or equivalent No   Physical Activity   On average, how many days per week do you engage in moderate to strenuous exercise (like a brisk walk)? 7 days   On average, how many minutes do you engage in exercise at this level? 140 min   Number of minutes of exercise per week 980   Alcohol Use   Q1: How often do you have a drink containing alcohol? Monthly or l   Q2: How many drinks containing alcohol do you have on a typical day when you are drinking? 10 or more  (10 shots.)   Q3: How often do you have six or more drinks on one occasion? Less than mo   Stress   Do you feel stress - tense, restless, nervous, or anxious, or unable to sleep at night because your mind is troubled all the time - these days? Not at all   Mental Health   Little Interest or Pleasure in Doing Things 0-->not at all   Feeling Down, Depressed or Hopeless 0-->not at all   Disabilities   Concentrating, Remembering or Making Decisions Difficulty no   Doing Errands Independently Difficulty (such as shopping) no     Electronically Signed By:  Aron Foley MSW      Date/Time: 7/23/24 14:33

## 2024-07-23 NOTE — PLAN OF CARE
"Goal Outcome Evaluation:     New admit to unit. Pt presents as irritable and guarded but cooperative. Pt reports anxiety of 5/10 and denies depression, SI, HI and AVH. Pt denies any history of SI or prior attempts on her life but does admit to self harming in the past. Pt has laceration on left forearm that she reports came from cutting herself with a \"shaving razor\". Wound consult requested. No PRNs given and pt resting at this time.                                            "

## 2024-07-23 NOTE — ED PROVIDER NOTES
I took over this patient's care from RUTH Osborn at approximately 9 PM on 7/22/2024.  On my exam of the patient patient did have a laceration to the arm that she admitted was self-inflicted and was verbalizing passive SI.  She has already been evaluated by behavioral health and was found to have elevated ethanol preventing her from admission to the [facility].  Repeat ethanol test showed improvement from 254 down to 85.  Behavioral Health consultant Magdy FLOWER assessed the patient and recommended admission, presented the patient to [facility] psychiatrist Dr. Benitez who agreed to admit the patient to [facility].     Jai Givens PA-C  07/23/24 0147

## 2024-08-05 DIAGNOSIS — E10.65 UNCONTROLLED TYPE 1 DIABETES MELLITUS WITH HYPERGLYCEMIA: ICD-10-CM

## 2024-08-05 DIAGNOSIS — E10.9 TYPE 1 DIABETES MELLITUS WITHOUT COMPLICATION: ICD-10-CM

## 2024-08-06 RX ORDER — CALCIUM CITRATE/VITAMIN D3 200MG-6.25
TABLET ORAL
Qty: 150 EACH | Refills: 1 | Status: SHIPPED | OUTPATIENT
Start: 2024-08-06

## 2024-08-06 RX ORDER — PROCHLORPERAZINE 25 MG/1
1 SUPPOSITORY RECTAL
Qty: 1 EACH | Refills: 0 | Status: SHIPPED | OUTPATIENT
Start: 2024-08-06

## 2024-08-06 NOTE — TELEPHONE ENCOUNTER
"    Caller: Suzanne Rowan \"Roxana Harpal\"    Relationship: Self    Best call back number: [phone]    Requested Prescriptions:   Requested Prescriptions     Pending Prescriptions Disp Refills    True Metrix Blood Glucose Test test strip 150 each 12     Sig: Test 4 times daily    Insulin Lispro 100 UNIT/ML solution cartridge 90 mL 3     Sig: USE UP TO 90 UNITS DAILY VIA INSULIN PUMP.    Continuous Glucose Transmitter (Dexcom G6 Transmitter) misc 1 each 3     Sig: Use 1 each Every 3 (Three) Months.        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

## 2024-08-06 NOTE — TELEPHONE ENCOUNTER
Rx Refill Note    Requested Prescriptions     Pending Prescriptions Disp Refills    True Metrix Blood Glucose Test test strip 150 each 12     Sig: Test 4 times daily    Insulin Lispro 100 UNIT/ML solution cartridge 90 mL 3     Sig: USE UP TO 90 UNITS DAILY VIA INSULIN PUMP.    Continuous Glucose Transmitter (Dexcom G6 Transmitter) misc 1 each 3     Sig: Use 1 each Every 3 (Three) Months.        Last office visit with prescribing clinician: 1/23/2024       Next office visit with prescribing clinician: no appt scheduled    {    Roxy Nolan MA  08/06/24, 15:46 EDT

## 2024-08-06 NOTE — TELEPHONE ENCOUNTER
Called he pt and told her she needs appt for refills.    She was transferred to the front to be scheduled.    She is scheduled for 9-27-24.

## 2024-08-26 ENCOUNTER — OFFICE VISIT (OUTPATIENT)
Dept: ENDOCRINOLOGY | Facility: CLINIC | Age: 29
End: 2024-08-26
Payer: MEDICAID

## 2024-08-26 VITALS
HEART RATE: 94 BPM | HEIGHT: 66 IN | DIASTOLIC BLOOD PRESSURE: 80 MMHG | OXYGEN SATURATION: 100 % | BODY MASS INDEX: 27.19 KG/M2 | SYSTOLIC BLOOD PRESSURE: 138 MMHG | WEIGHT: 169.2 LBS

## 2024-08-26 DIAGNOSIS — R63.4 WEIGHT LOSS: ICD-10-CM

## 2024-08-26 DIAGNOSIS — Z46.81 INSULIN PUMP TITRATION: Chronic | ICD-10-CM

## 2024-08-26 DIAGNOSIS — E10.649 CONTROLLED TYPE 1 DIABETES MELLITUS WITH HYPOGLYCEMIA: Primary | Chronic | ICD-10-CM

## 2024-08-26 LAB
EXPIRATION DATE: ABNORMAL
EXPIRATION DATE: ABNORMAL
GLUCOSE BLDC GLUCOMTR-MCNC: 207 MG/DL (ref 70–130)
HBA1C MFR BLD: 5.6 % (ref 4.5–5.7)
Lab: ABNORMAL
Lab: ABNORMAL

## 2024-08-26 PROCEDURE — 3044F HG A1C LEVEL LT 7.0%: CPT | Performed by: PHYSICIAN ASSISTANT

## 2024-08-26 PROCEDURE — 83036 HEMOGLOBIN GLYCOSYLATED A1C: CPT | Performed by: PHYSICIAN ASSISTANT

## 2024-08-26 PROCEDURE — 99214 OFFICE O/P EST MOD 30 MIN: CPT | Performed by: PHYSICIAN ASSISTANT

## 2024-08-26 PROCEDURE — 1159F MED LIST DOCD IN RCRD: CPT | Performed by: PHYSICIAN ASSISTANT

## 2024-08-26 PROCEDURE — 1160F RVW MEDS BY RX/DR IN RCRD: CPT | Performed by: PHYSICIAN ASSISTANT

## 2024-08-26 PROCEDURE — 95251 CONT GLUC MNTR ANALYSIS I&R: CPT | Performed by: PHYSICIAN ASSISTANT

## 2024-08-26 RX ORDER — PROCHLORPERAZINE 25 MG/1
1 SUPPOSITORY RECTAL
Qty: 1 EACH | Refills: 0 | Status: SHIPPED | OUTPATIENT
Start: 2024-08-26

## 2024-08-26 RX ORDER — PROCHLORPERAZINE 25 MG/1
SUPPOSITORY RECTAL
Qty: 3 EACH | Refills: 11 | Status: SHIPPED | OUTPATIENT
Start: 2024-08-26

## 2024-08-26 NOTE — PROGRESS NOTES
Chief Complaint  F/u for Diabetes Mellitus.    HPI   Suzanne Rowan is a 29 y.o. female who is here today for F/u of Diabetes Mellitus type 1. The initial diagnosis of diabetes was made 1/2018.    Last appt 1/2024.  Has lost 30 pounds since last visit. Under a lot of stress, not eating much, working 6 days per week. TFTs have been normal multiple times since last visit. No dizziness, syncope, salt cravings.     Diabetic complications: peripheral neuropathy, numbness top of both feet, R>L, better since starting gabapentin    Current diabetic medications include:  Humalog via Tandem insulin pump    ACEI/ARB: no    Statin: no    Past medications: gabapentin 100mg qd (didn't notice a difference off the medication)    Diabetic Monitoring  -   Tandem pump/Dexcom downloaded and reviewed (8/13/24-8/26/24): Time in range 98%, basal 94%, bolus 6%, fasting BG ~100 with occasional night time hypoglycemia, most 2 hour postprandial readings <180    The following portions of the patient's history were reviewed and updated by me as appropriate: allergies, current medications, past family history, past social history, past surgical history and problem list.    Past Medical History:   Diagnosis Date    Acid reflux     Alcohol abuse     Bipolar disorder     Borderline personality disorder     Diabetes mellitus type I     Gestational diabetes 2014    Not sure the exact date    Migraine        Medications    Current Outpatient Medications:     Blood Glucose Monitoring Suppl (TRUE METRIX METER) w/Device kit, TEST THREE TIMES DAILY, Disp: , Rfl: 0    Continuous Glucose Sensor (Dexcom G6 Sensor), Use Every 10 (Ten) Days., Disp: 3 each, Rfl: 11    Continuous Glucose Transmitter (Dexcom G6 Transmitter) misc, Use 1 each Every 3 (Three) Months., Disp: 1 each, Rfl: 0    GlucaGen HypoKit 1 MG injection, Inject 1 mg under the skin into the appropriate area as directed As Needed for Low Blood Sugar., Disp: 1 each, Rfl: 11    glucagon (GLUCAGEN) 1  "MG injection, Inject 1 mg into the appropriate muscle as directed by prescriber., Disp: , Rfl:     Insulin Infusion Pump (T: slim X2 Ins /Control 7.4) device, Use., Disp: , Rfl:     Insulin Lispro 100 UNIT/ML solution cartridge, USE UP TO 90 UNITS DAILY VIA INSULIN PUMP., Disp: 90 mL, Rfl: 1    True Metrix Blood Glucose Test test strip, Test 4 times daily, Disp: 150 each, Rfl: 1    TRUEplus Safety Lancets 28G misc, Use 4x/day for testing, Disp: 150 each, Rfl: 12    Review of Systems  Review of Systems   All other systems reviewed and are negative.       Physical Exam    /80 (BP Location: Right arm, Patient Position: Sitting, Cuff Size: Adult)   Pulse 94   Ht 167.6 cm (66\")   Wt 76.7 kg (169 lb 3.2 oz)   SpO2 100%   BMI 27.31 kg/m² Body mass index is 27.31 kg/m².  Physical Exam   Constitutional: She is oriented to person, place, and time. She appears well-developed. No distress.   HENT:   Head: Normocephalic.   Eyes: Lids are normal. Right pupil is reactive. Left pupil is reactive.   Neck: No JVD present. No tracheal deviation present. No thyroid mass and no thyromegaly present.   Cardiovascular: Normal rate and regular rhythm.   Pulmonary/Chest: Effort normal and breath sounds normal.   Musculoskeletal: No tenderness.   Lymphadenopathy:     She has no cervical adenopathy.   Neurological: She is alert and oriented to person, place, and time.   Skin: Skin is warm and dry. No rash noted. She is not diaphoretic. No erythema.   Psychiatric: Her speech is normal and behavior is normal. Thought content normal.         Labs and Imaging   Lab Results   Component Value Date    HGBA1C 5.6 (A) 08/26/2024    HGBA1C 6.0 (A) 01/23/2024    HGBA1C 7.8 01/11/2023     Office Visit on 08/26/2024   Component Date Value Ref Range Status    Hemoglobin A1C 08/26/2024 5.6 (A)  4.5 - 5.7 % Corrected    Lot Number 08/26/2024 [id number]   Final    Expiration Date 08/26/2024 5/27/26   Final    Glucose 08/26/2024 207 (A)  70 - 130 " mg/dL Final    Lot Number 08/26/2024 [id number]   Final    Expiration Date 08/26/2024 1/20/25   Final         Assessment / Plan   Diagnoses and all orders for this visit:    1. Controlled type 1 diabetes mellitus with hypoglycemia (Primary)  -     POC Glycosylated Hemoglobin (Hb A1C)  -     POC Glucose, Blood  -     Continuous Glucose Transmitter (Dexcom G6 Transmitter) misc; Use 1 each Every 3 (Three) Months.  Dispense: 1 each; Refill: 0  -     Continuous Glucose Sensor (Dexcom G6 Sensor); Use Every 10 (Ten) Days.  Dispense: 3 each; Refill: 11  -     Insulin Lispro 100 UNIT/ML solution cartridge; USE UP TO 90 UNITS DAILY VIA INSULIN PUMP.  Dispense: 90 mL; Refill: 1    2. Insulin pump titration    3. Weight loss        Diabetes Mellitus 1 under good control  -A1C 5.6  -98% time in range  -occasional night time hypoglycemia - decrease basal rate  -foot exam and labs updated 1/2024  -eye exam is due and pt is aware    Insulin pump titration  -decrease basal rate slightly    Weight loss  -appears to be due to stress and working 6 days per week  -low suspicion for adrenal insufficiency    There are no Patient Instructions on file for this visit.    Follow up: Return in about 6 months (around 2/26/2025).    Anais Cee PA-C        .

## 2025-02-27 DIAGNOSIS — E10.649 CONTROLLED TYPE 1 DIABETES MELLITUS WITH HYPOGLYCEMIA: Chronic | ICD-10-CM

## 2025-02-27 DIAGNOSIS — E16.2 HYPOGLYCEMIA: ICD-10-CM

## 2025-02-27 RX ORDER — PROCHLORPERAZINE 25 MG/1
1 SUPPOSITORY RECTAL
Qty: 1 EACH | Refills: 1 | Status: SHIPPED | OUTPATIENT
Start: 2025-02-27

## 2025-02-27 RX ORDER — INSULIN LISPRO 100 [IU]/ML
INJECTION, SOLUTION INTRAVENOUS; SUBCUTANEOUS
Qty: 90 ML | Refills: 3 | Status: SHIPPED | OUTPATIENT
Start: 2025-02-27

## 2025-02-27 RX ORDER — GLUCAGON HYDROCHLORIDE 1 MG
1 KIT INJECTION AS NEEDED
Qty: 1 EACH | Refills: 11 | Status: SHIPPED | OUTPATIENT
Start: 2025-02-27

## 2025-02-27 RX ORDER — PROCHLORPERAZINE 25 MG/1
SUPPOSITORY RECTAL
Qty: 3 EACH | Refills: 11 | Status: SHIPPED | OUTPATIENT
Start: 2025-02-27

## 2025-02-27 NOTE — TELEPHONE ENCOUNTER
Rx Refill Note  Requested Prescriptions     Pending Prescriptions Disp Refills    GlucaGen HypoKit 1 MG injection 1 each 11     Sig: Inject 1 mg under the skin into the appropriate area as directed As Needed for Low Blood Sugar.    Continuous Glucose Transmitter (Dexcom G6 Transmitter) misc 1 each 0     Sig: Use 1 each Every 3 (Three) Months.    Continuous Glucose Sensor (Dexcom G6 Sensor) 3 each 11     Sig: Use Every 10 (Ten) Days.    Insulin Lispro 100 UNIT/ML solution cartridge 90 mL 1     Sig: USE UP TO 90 UNITS DAILY VIA INSULIN PUMP.      Last office visit with prescribing clinician: 8/26/2024   Last telemedicine visit with prescribing clinician: Visit date not found   Next office visit with prescribing clinician: Visit date not found                         Would you like a call back once the refill request has been completed: [] Yes [] No    If the office needs to give you a call back, can they leave a voicemail: [] Yes [] No    Verónica Driscoll MA  02/27/25, 10:32 EST

## 2025-05-20 DIAGNOSIS — E10.649 CONTROLLED TYPE 1 DIABETES MELLITUS WITH HYPOGLYCEMIA: Chronic | ICD-10-CM

## 2025-05-20 RX ORDER — PROCHLORPERAZINE 25 MG/1
SUPPOSITORY RECTAL
Qty: 3 EACH | Refills: 3 | Status: SHIPPED | OUTPATIENT
Start: 2025-05-20

## 2025-05-20 NOTE — TELEPHONE ENCOUNTER
Rx Refill Note  Requested Prescriptions     Pending Prescriptions Disp Refills    Continuous Glucose Sensor (Dexcom G6 Sensor) 3 each 11     Sig: Use Every 10 (Ten) Days.      Last office visit with prescribing clinician: Visit date not found   Last telemedicine visit with prescribing clinician: Visit date not found   Next office visit with prescribing clinician: 9/9/2025       Advised patient insurance may not cover and to contact customer service of .      Wendy Frost MA  05/20/25, 09:58 EDT

## 2025-07-01 ENCOUNTER — PRIOR AUTHORIZATION (OUTPATIENT)
Dept: ENDOCRINOLOGY | Facility: CLINIC | Age: 30
End: 2025-07-01

## 2025-07-01 NOTE — TELEPHONE ENCOUNTER
Suzanne Rowan (Mccabe: [id number])  PA Case ID #: [id number]  Rx #: [id number]  Need Help? Call us at [phone]  Outcome  Approved today by [place] [employer] 2017  The request has been approved. The authorization is effective from 07/01/2025 to 07/01/2026, as long as the member is enrolled in their current health plan. A written notification letter will follow with additional details.  Effective Date: 7/1/2025  Authorization Expiration Date: 7/1/2026  Drug  Dexcom G6 Sensor  ePA cloud logo  Form  [employer] [place] [employer] ePA Form 2017 NCPDP  Original Claim Info  75 TRANS FEE = 0.00PA REQUIRED, SUBMIT THRU [email or web address]